# Patient Record
Sex: FEMALE
[De-identification: names, ages, dates, MRNs, and addresses within clinical notes are randomized per-mention and may not be internally consistent; named-entity substitution may affect disease eponyms.]

---

## 2018-10-02 ENCOUNTER — CHARTING TRANS (OUTPATIENT)
Dept: OTHER | Age: 78
End: 2018-10-02

## 2021-06-29 ENCOUNTER — APPOINTMENT (OUTPATIENT)
Dept: GENERAL RADIOLOGY | Age: 81
End: 2021-06-29
Attending: NURSE PRACTITIONER
Payer: MEDICARE

## 2021-06-29 ENCOUNTER — HOSPITAL ENCOUNTER (OUTPATIENT)
Age: 81
Discharge: HOME | End: 2021-06-29
Payer: MEDICARE

## 2021-06-29 VITALS
TEMPERATURE: 98 F | RESPIRATION RATE: 20 BRPM | OXYGEN SATURATION: 100 % | HEART RATE: 73 BPM | SYSTOLIC BLOOD PRESSURE: 140 MMHG | DIASTOLIC BLOOD PRESSURE: 84 MMHG

## 2021-06-29 DIAGNOSIS — S93.402A MILD SPRAIN OF LEFT ANKLE, INITIAL ENCOUNTER: ICD-10-CM

## 2021-06-29 DIAGNOSIS — S92.252A CLOSED AVULSION FRACTURE OF NAVICULAR BONE OF LEFT FOOT, INITIAL ENCOUNTER: ICD-10-CM

## 2021-06-29 DIAGNOSIS — S92.352A CLOSED DISPLACED FRACTURE OF FIFTH METATARSAL BONE OF LEFT FOOT, INITIAL ENCOUNTER: ICD-10-CM
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
## 2021-06-29 PROCEDURE — 99204 OFFICE O/P NEW MOD 45 MIN: CPT | Performed by: NURSE PRACTITIONER

## 2021-06-29 PROCEDURE — E0143 WALKER FOLDING WHEELED W/O S: HCPCS | Performed by: NURSE PRACTITIONER

## 2021-06-29 PROCEDURE — 73630 X-RAY EXAM OF FOOT: CPT | Performed by: NURSE PRACTITIONER

## 2021-06-29 PROCEDURE — 73610 X-RAY EXAM OF ANKLE: CPT | Performed by: NURSE PRACTITIONER

## 2021-06-29 PROCEDURE — 29515 APPLICATION SHORT LEG SPLINT: CPT | Performed by: NURSE PRACTITIONER

## 2021-06-30 NOTE — ED INITIAL ASSESSMENT (HPI)
Pt came in due to fall. Pt stated \"I slipped on two stairs on my porch\". Pt stated fall occurred about a week and a half ago. Pt denies any head injury or LOC. Pt denies taking any blood thinners. Pt has easy non labored respirations. Pt is ambulatory.

## 2021-06-30 NOTE — ED PROVIDER NOTES
Patient Seen in: [facility]      History   Patient presents with:  Leg or Foot Injury    Stated Complaint: LT ANKLE INJURY    HPI/Subjective:   HPI    This is an 80-year-old female presenting for ankle pain and swelling.   Patient states, abo Normal range of motion.         Feet:       Comments: Thoracic lumbar spine no midline TTP or step-offs   Feet:      Comments: Soft tissue swelling and tenderness to palpation left lateral malleolus    Bruising and tenderness to palpation to 2 through 5 met and PE as documented above. X-rays ordered of the foot and ankle anticipate follow-up with orthopedic specialist.    X-rays as resulted above. Discussed x-rays with patient and family at bedside for fractures in the foot.   Discussed application of short

## 2021-07-08 ENCOUNTER — HOSPITAL ENCOUNTER (OUTPATIENT)
Age: 81
Discharge: HOME | End: 2021-07-08
Payer: MEDICARE

## 2021-07-08 VITALS
TEMPERATURE: 98 F | SYSTOLIC BLOOD PRESSURE: 149 MMHG | OXYGEN SATURATION: 98 % | RESPIRATION RATE: 18 BRPM | HEART RATE: 62 BPM | DIASTOLIC BLOOD PRESSURE: 68 MMHG

## 2021-07-08 DIAGNOSIS — S81.802A WOUND OF LEFT LOWER EXTREMITY, INITIAL ENCOUNTER: Primary | ICD-10-CM

## 2021-07-08 DIAGNOSIS — L03.116 CELLULITIS OF LEFT LOWER EXTREMITY: ICD-10-CM

## 2021-07-08 PROCEDURE — A6449 LT COMPRES BAND >=3" <5"/YD: HCPCS | Performed by: NURSE PRACTITIONER

## 2021-07-08 PROCEDURE — 99214 OFFICE O/P EST MOD 30 MIN: CPT | Performed by: NURSE PRACTITIONER

## 2021-07-08 RX ORDER — CEFADROXIL 500 MG/1
500 CAPSULE ORAL 2 TIMES DAILY
Qty: 20 CAPSULE | Refills: 0 | Status: SHIPPED | OUTPATIENT
Start: 2021-07-08 | End: 2021-07-18

## 2021-07-08 RX ORDER — CEFADROXIL 500 MG/1
500 CAPSULE ORAL 2 TIMES DAILY
Qty: 20 CAPSULE | Refills: 0 | Status: SHIPPED | OUTPATIENT
Start: 2021-07-08 | End: 2021-07-08

## 2021-07-08 NOTE — ED INITIAL ASSESSMENT (HPI)
Pt came in due to left foot issue. Pt has a boot placed on her left foot due to fractures, pt stated the boot is uncomfortable and is started to cause a pressure ulcer on her left shin.  Pt stated \"I do not want to wear this boot anymore because it is not

## 2022-06-16 ENCOUNTER — HOSPITAL ENCOUNTER (OUTPATIENT)
Age: 82
Discharge: HOME | End: 2022-06-16
Payer: MEDICARE

## 2022-06-16 VITALS
SYSTOLIC BLOOD PRESSURE: 120 MMHG | RESPIRATION RATE: 18 BRPM | OXYGEN SATURATION: 99 % | TEMPERATURE: 98 F | HEART RATE: 80 BPM | DIASTOLIC BLOOD PRESSURE: 68 MMHG

## 2022-06-16 DIAGNOSIS — Z11.52 ENCOUNTER FOR SCREENING FOR COVID-19: ICD-10-CM

## 2022-06-16 DIAGNOSIS — U07.1 COVID-19 VIRUS DETECTED: Primary | ICD-10-CM

## 2022-06-16 LAB — SARS-COV-2 RNA RESP QL NAA+PROBE: DETECTED

## 2022-06-16 PROCEDURE — U0002 COVID-19 LAB TEST NON-CDC: HCPCS | Performed by: NURSE PRACTITIONER

## 2022-06-16 PROCEDURE — M0222 INTRAVENOUS INJECTION, BEBTELOVIMAB, INCLUDES INJECTION AND POST ADMINISTRATIVE MONITORING: HCPCS | Performed by: NURSE PRACTITIONER

## 2022-06-16 PROCEDURE — 99214 OFFICE O/P EST MOD 30 MIN: CPT | Performed by: NURSE PRACTITIONER

## 2022-06-16 RX ORDER — HYDROCODONE BITARTRATE AND ACETAMINOPHEN 5; 325 MG/1; MG/1
1 TABLET ORAL EVERY 4 HOURS PRN
COMMUNITY
Start: 2022-04-29

## 2022-06-16 RX ORDER — PRAVASTATIN SODIUM 20 MG
TABLET ORAL
COMMUNITY
Start: 2021-04-15

## 2022-06-16 RX ORDER — OMEPRAZOLE 20 MG/1
20 CAPSULE, DELAYED RELEASE ORAL DAILY
COMMUNITY
Start: 2022-04-05

## 2022-06-16 RX ORDER — ESTRADIOL 0.1 MG/G
1 CREAM VAGINAL AS DIRECTED
COMMUNITY
Start: 2022-02-22

## 2022-06-16 RX ORDER — BEBTELOVIMAB 87.5 MG/ML
175 INJECTION, SOLUTION INTRAVENOUS ONCE
Status: COMPLETED | OUTPATIENT
Start: 2022-06-16 | End: 2022-06-16

## 2022-06-16 RX ORDER — BICTEGRAVIR SODIUM, EMTRICITABINE, AND TENOFOVIR ALAFENAMIDE FUMARATE 50; 200; 25 MG/1; MG/1; MG/1
1 TABLET ORAL DAILY
COMMUNITY
Start: 2022-04-05

## 2022-06-16 RX ORDER — BUPROPION HYDROCHLORIDE 300 MG/1
TABLET ORAL
COMMUNITY
Start: 2022-04-13

## 2022-06-16 RX ORDER — LEVOTHYROXINE SODIUM 0.15 MG/1
TABLET ORAL
COMMUNITY
Start: 2022-02-11

## 2022-06-16 RX ORDER — LOSARTAN POTASSIUM 50 MG/1
TABLET ORAL
COMMUNITY
Start: 2022-02-11

## 2022-06-16 NOTE — ED INITIAL ASSESSMENT (HPI)
Pt states found out grandchild was pos for covid on Monday tested pos. PT states felt bad Tuesday and slept all day and yesterday felt great. Pt states was going to a luncheon today but tested to make sure she was okay but tested pos on an at home.

## 2023-06-01 ENCOUNTER — APPOINTMENT (OUTPATIENT)
Dept: GENERAL RADIOLOGY | Age: 83
End: 2023-06-01
Attending: NURSE PRACTITIONER
Payer: MEDICARE

## 2023-06-01 ENCOUNTER — HOSPITAL ENCOUNTER (INPATIENT)
Facility: HOSPITAL | Age: 83
LOS: 2 days | Discharge: HOME | End: 2023-06-03
Attending: EMERGENCY MEDICINE | Admitting: HOSPITALIST
Payer: MEDICARE

## 2023-06-01 ENCOUNTER — APPOINTMENT (OUTPATIENT)
Dept: CT IMAGING | Facility: HOSPITAL | Age: 83
End: 2023-06-01
Attending: EMERGENCY MEDICINE
Payer: MEDICARE

## 2023-06-01 ENCOUNTER — HOSPITAL ENCOUNTER (OUTPATIENT)
Age: 83
Discharge: ANOTHER HOSPITAL | End: 2023-06-01
Payer: MEDICARE

## 2023-06-01 VITALS
DIASTOLIC BLOOD PRESSURE: 57 MMHG | TEMPERATURE: 97 F | OXYGEN SATURATION: 98 % | RESPIRATION RATE: 20 BRPM | SYSTOLIC BLOOD PRESSURE: 117 MMHG | HEART RATE: 76 BPM

## 2023-06-01 DIAGNOSIS — S32.010A COMPRESSION FRACTURE OF L1 VERTEBRA, INITIAL ENCOUNTER (HCC): Primary | ICD-10-CM

## 2023-06-01 DIAGNOSIS — M54.9 BACK PAIN WITHOUT RADIATION: ICD-10-CM

## 2023-06-01 DIAGNOSIS — S32.000A COMPRESSION FRACTURE OF LUMBAR VERTEBRA, UNSPECIFIED LUMBAR VERTEBRAL LEVEL, INITIAL ENCOUNTER (HCC): Primary | ICD-10-CM

## 2023-06-01 LAB
ANION GAP SERPL CALC-SCNC: 6 MMOL/L (ref 0–18)
APTT PPP: 26.6 SECONDS (ref 23.3–35.6)
BASOPHILS # BLD AUTO: 0.01 X10(3) UL (ref 0–0.2)
BASOPHILS NFR BLD AUTO: 0.2 %
BUN BLD-MCNC: 24 MG/DL (ref 7–18)
BUN/CREAT SERPL: 24.5 (ref 10–20)
CALCIUM BLD-MCNC: 9.5 MG/DL (ref 8.5–10.1)
CHLORIDE SERPL-SCNC: 110 MMOL/L (ref 98–112)
CO2 SERPL-SCNC: 26 MMOL/L (ref 21–32)
CREAT BLD-MCNC: 0.98 MG/DL
DEPRECATED RDW RBC AUTO: 49.9 FL (ref 35.1–46.3)
EOSINOPHIL # BLD AUTO: 0.08 X10(3) UL (ref 0–0.7)
EOSINOPHIL NFR BLD AUTO: 1.4 %
ERYTHROCYTE [DISTWIDTH] IN BLOOD BY AUTOMATED COUNT: 14 % (ref 11–15)
GFR SERPLBLD BASED ON 1.73 SQ M-ARVRAT: 58 ML/MIN/1.73M2 (ref 60–?)
GLUCOSE BLD-MCNC: 113 MG/DL (ref 70–99)
HCT VFR BLD AUTO: 34 %
HGB BLD-MCNC: 11.2 G/DL
IMM GRANULOCYTES # BLD AUTO: 0 X10(3) UL (ref 0–1)
IMM GRANULOCYTES NFR BLD: 0 %
INR BLD: 1.02 (ref 0.85–1.16)
LYMPHOCYTES # BLD AUTO: 2.21 X10(3) UL (ref 1–4)
LYMPHOCYTES NFR BLD AUTO: 37.5 %
MCH RBC QN AUTO: 32.2 PG (ref 26–34)
MCHC RBC AUTO-ENTMCNC: 32.9 G/DL (ref 31–37)
MCV RBC AUTO: 97.7 FL
MONOCYTES # BLD AUTO: 0.59 X10(3) UL (ref 0.1–1)
MONOCYTES NFR BLD AUTO: 10 %
NEUTROPHILS # BLD AUTO: 3.01 X10 (3) UL (ref 1.5–7.7)
NEUTROPHILS # BLD AUTO: 3.01 X10(3) UL (ref 1.5–7.7)
NEUTROPHILS NFR BLD AUTO: 50.9 %
OSMOLALITY SERPL CALC.SUM OF ELEC: 299 MOSM/KG (ref 275–295)
PLATELET # BLD AUTO: 289 10(3)UL (ref 150–450)
POTASSIUM SERPL-SCNC: 4.2 MMOL/L (ref 3.5–5.1)
PROTHROMBIN TIME: 13.3 SECONDS (ref 11.6–14.8)
RBC # BLD AUTO: 3.48 X10(6)UL
SODIUM SERPL-SCNC: 142 MMOL/L (ref 136–145)
WBC # BLD AUTO: 5.9 X10(3) UL (ref 4–11)

## 2023-06-01 PROCEDURE — 99223 1ST HOSP IP/OBS HIGH 75: CPT | Performed by: HOSPITALIST

## 2023-06-01 PROCEDURE — 99214 OFFICE O/P EST MOD 30 MIN: CPT | Performed by: NURSE PRACTITIONER

## 2023-06-01 PROCEDURE — 72100 X-RAY EXAM L-S SPINE 2/3 VWS: CPT | Performed by: NURSE PRACTITIONER

## 2023-06-01 PROCEDURE — 72131 CT LUMBAR SPINE W/O DYE: CPT | Performed by: EMERGENCY MEDICINE

## 2023-06-01 RX ORDER — BISACODYL 10 MG
10 SUPPOSITORY, RECTAL RECTAL
Status: DISCONTINUED | OUTPATIENT
Start: 2023-06-01 | End: 2023-06-03

## 2023-06-01 RX ORDER — HYDROCODONE BITARTRATE AND ACETAMINOPHEN 5; 325 MG/1; MG/1
1 TABLET ORAL EVERY 4 HOURS PRN
Status: DISCONTINUED | OUTPATIENT
Start: 2023-06-01 | End: 2023-06-03

## 2023-06-01 RX ORDER — NITROFURANTOIN 25; 75 MG/1; MG/1
CAPSULE ORAL
COMMUNITY
Start: 2023-05-02 | End: 2023-06-03

## 2023-06-01 RX ORDER — MORPHINE SULFATE 2 MG/ML
2 INJECTION, SOLUTION INTRAMUSCULAR; INTRAVENOUS EVERY 2 HOUR PRN
Status: DISCONTINUED | OUTPATIENT
Start: 2023-06-01 | End: 2023-06-03

## 2023-06-01 RX ORDER — MORPHINE SULFATE 2 MG/ML
1 INJECTION, SOLUTION INTRAMUSCULAR; INTRAVENOUS EVERY 2 HOUR PRN
Status: DISCONTINUED | OUTPATIENT
Start: 2023-06-01 | End: 2023-06-03

## 2023-06-01 RX ORDER — BUPROPION HYDROCHLORIDE 300 MG/1
300 TABLET ORAL DAILY
Status: DISCONTINUED | OUTPATIENT
Start: 2023-06-02 | End: 2023-06-03

## 2023-06-01 RX ORDER — ACETAMINOPHEN 325 MG/1
650 TABLET ORAL EVERY 4 HOURS PRN
Status: DISCONTINUED | OUTPATIENT
Start: 2023-06-01 | End: 2023-06-03

## 2023-06-01 RX ORDER — HYDROCODONE BITARTRATE AND ACETAMINOPHEN 5; 325 MG/1; MG/1
1 TABLET ORAL ONCE
Status: COMPLETED | OUTPATIENT
Start: 2023-06-01 | End: 2023-06-01

## 2023-06-01 RX ORDER — GENTAMICIN SULFATE 1 MG/G
OINTMENT TOPICAL
COMMUNITY
Start: 2023-01-06

## 2023-06-01 RX ORDER — MORPHINE SULFATE 4 MG/ML
4 INJECTION, SOLUTION INTRAMUSCULAR; INTRAVENOUS EVERY 2 HOUR PRN
Status: DISCONTINUED | OUTPATIENT
Start: 2023-06-01 | End: 2023-06-03

## 2023-06-01 RX ORDER — LEVOTHYROXINE SODIUM 0.15 MG/1
150 TABLET ORAL
Status: DISCONTINUED | OUTPATIENT
Start: 2023-06-02 | End: 2023-06-02

## 2023-06-01 RX ORDER — PRAVASTATIN SODIUM 20 MG
20 TABLET ORAL NIGHTLY
Status: DISCONTINUED | OUTPATIENT
Start: 2023-06-01 | End: 2023-06-03

## 2023-06-01 RX ORDER — HYDROCODONE BITARTRATE AND ACETAMINOPHEN 5; 325 MG/1; MG/1
2 TABLET ORAL EVERY 4 HOURS PRN
Status: DISCONTINUED | OUTPATIENT
Start: 2023-06-01 | End: 2023-06-03

## 2023-06-01 RX ORDER — METOCLOPRAMIDE HYDROCHLORIDE 5 MG/ML
10 INJECTION INTRAMUSCULAR; INTRAVENOUS EVERY 8 HOURS PRN
Status: DISCONTINUED | OUTPATIENT
Start: 2023-06-01 | End: 2023-06-03

## 2023-06-01 RX ORDER — LOSARTAN POTASSIUM 100 MG/1
TABLET ORAL
COMMUNITY
Start: 2023-04-27

## 2023-06-01 RX ORDER — SENNOSIDES 8.6 MG
17.2 TABLET ORAL NIGHTLY PRN
Status: DISCONTINUED | OUTPATIENT
Start: 2023-06-01 | End: 2023-06-03

## 2023-06-01 RX ORDER — ENEMA 19; 7 G/133ML; G/133ML
1 ENEMA RECTAL ONCE AS NEEDED
Status: DISCONTINUED | OUTPATIENT
Start: 2023-06-01 | End: 2023-06-03

## 2023-06-01 RX ORDER — ONDANSETRON 2 MG/ML
4 INJECTION INTRAMUSCULAR; INTRAVENOUS EVERY 6 HOURS PRN
Status: DISCONTINUED | OUTPATIENT
Start: 2023-06-01 | End: 2023-06-03

## 2023-06-01 RX ORDER — CLINDAMYCIN HYDROCHLORIDE 300 MG/1
CAPSULE ORAL
COMMUNITY
Start: 2023-01-06 | End: 2023-06-03

## 2023-06-01 RX ORDER — POLYETHYLENE GLYCOL 3350 17 G/17G
17 POWDER, FOR SOLUTION ORAL DAILY PRN
Status: DISCONTINUED | OUTPATIENT
Start: 2023-06-01 | End: 2023-06-03

## 2023-06-01 RX ORDER — PANTOPRAZOLE SODIUM 20 MG/1
20 TABLET, DELAYED RELEASE ORAL
Status: DISCONTINUED | OUTPATIENT
Start: 2023-06-02 | End: 2023-06-03

## 2023-06-01 NOTE — CM/SW NOTE
06/01/23 1700   CM/SW Referral Data   Referral Source    Reason for Referral Discharge planning   Informant Patient   Medical Hx   Does patient have an established PCP? Yes  Ida Shape)   Patient Info   Patient's Current Mental Status at Time of Assessment Alert;Oriented   Patient's Home Environment Condo/Apt with elevator   Patient lives with Alone   Patient Status Prior to Admission   Independent with ADLs and Mobility Yes   Discharge Needs   Anticipated D/C needs No anticipated discharge needs     Pt discussed during nursing rounds. Dx L2 compression fx, ambulatory in room. From home alone, independent w/o device prior to admission. Pt's daughter will stay with patient for extra assistance at dc. Pt declining HH services if recommended at dc. No home care needs anticipated on dc. Plan: Home w/daughter pending evals and medical clearance. / to remain available for support and/or discharge planning.      RUBIA Ch    [phone]

## 2023-06-01 NOTE — ED QUICK NOTES
Orders for admission, patient is aware of plan and ready to go upstairs. Any questions, please call ED RN aissatou at extension [phone].      Patient Covid vaccination status: Fully vaccinated     COVID Test Ordered in ED: None    COVID Suspicion at Admission: N/A    Running Infusions:  None    Mental Status/LOC at time of transport: AXOX4    Other pertinent information:   CIWA score: N/A   NIH score:  N/A

## 2023-06-01 NOTE — ED INITIAL ASSESSMENT (HPI)
Pt states on Monday had a 2 drinks and became dizzy, pt states took a back step off a curb and fell back. Pt denies LOC. Pt states felt okay and get back up. Pt states had some slight back pain, but then a few hours after the fall the pain in back became worse. Pt states was feeling better but then this morning pain returned once again. Pt states walking helps with pain.

## 2023-06-01 NOTE — PLAN OF CARE
Patient is a direct admit from ED. Patient fell few days ago on back pain getting worse. Patient is A&IOX4, RA, SCDs bilat, patient is denying needing pain medication at this time. Patient is up standby assist in room, does not need walker. Patient tolerating diet well. Plan is for MRI(MRI screen done) and then consult with ortho after MRI results for actual plan. Patient has small cut on right shin and has put a small Band-Aid on it from home. No bruising noted on back/bottom, patient sore on lower back gave hot pack, patient states this is the best for her. Patient is HIV positive(patient would like to remain confidential with this information). Patient is saline locked. Bed in lowest position and call light within reach.    Problem: Patient Centered Care  Goal: Patient preferences are identified and integrated in the patient's plan of care  Description: Interventions:  - What would you like us to know as we care for you?   - Provide timely, complete, and accurate information to patient/family  - Incorporate patient and family knowledge, values, beliefs, and cultural backgrounds into the planning and delivery of care  - Encourage patient/family to participate in care and decision-making at the level they choose  - Honor patient and family perspectives and choices  Outcome: Progressing

## 2023-06-01 NOTE — ED INITIAL ASSESSMENT (HPI)
Patient here with daughter in law for lower back pain s/p fall on Monday. Went to IC had an xray done which showed a compression fx. Pt was told she needed to have a CT scan done. HX of HIV, states her numbers are under control.

## 2023-06-02 ENCOUNTER — APPOINTMENT (OUTPATIENT)
Dept: MRI IMAGING | Facility: HOSPITAL | Age: 83
End: 2023-06-02
Attending: HOSPITALIST
Payer: MEDICARE

## 2023-06-02 LAB
ANION GAP SERPL CALC-SCNC: 2 MMOL/L (ref 0–18)
BASOPHILS # BLD AUTO: 0.02 X10(3) UL (ref 0–0.2)
BASOPHILS NFR BLD AUTO: 0.4 %
BUN BLD-MCNC: 24 MG/DL (ref 7–18)
BUN/CREAT SERPL: 26.4 (ref 10–20)
CALCIUM BLD-MCNC: 8.9 MG/DL (ref 8.5–10.1)
CHLORIDE SERPL-SCNC: 108 MMOL/L (ref 98–112)
CO2 SERPL-SCNC: 28 MMOL/L (ref 21–32)
CREAT BLD-MCNC: 0.91 MG/DL
DEPRECATED RDW RBC AUTO: 48.2 FL (ref 35.1–46.3)
EOSINOPHIL # BLD AUTO: 0.12 X10(3) UL (ref 0–0.7)
EOSINOPHIL NFR BLD AUTO: 2.2 %
ERYTHROCYTE [DISTWIDTH] IN BLOOD BY AUTOMATED COUNT: 13.6 % (ref 11–15)
GFR SERPLBLD BASED ON 1.73 SQ M-ARVRAT: 63 ML/MIN/1.73M2 (ref 60–?)
GLUCOSE BLD-MCNC: 104 MG/DL (ref 70–99)
HCT VFR BLD AUTO: 32.8 %
HGB BLD-MCNC: 10.8 G/DL
IMM GRANULOCYTES # BLD AUTO: 0.01 X10(3) UL (ref 0–1)
IMM GRANULOCYTES NFR BLD: 0.2 %
LYMPHOCYTES # BLD AUTO: 2.58 X10(3) UL (ref 1–4)
LYMPHOCYTES NFR BLD AUTO: 47.9 %
MAGNESIUM SERPL-MCNC: 2.2 MG/DL (ref 1.6–2.6)
MCH RBC QN AUTO: 31.5 PG (ref 26–34)
MCHC RBC AUTO-ENTMCNC: 32.9 G/DL (ref 31–37)
MCV RBC AUTO: 95.6 FL
MONOCYTES # BLD AUTO: 0.63 X10(3) UL (ref 0.1–1)
MONOCYTES NFR BLD AUTO: 11.7 %
NEUTROPHILS # BLD AUTO: 2.03 X10 (3) UL (ref 1.5–7.7)
NEUTROPHILS # BLD AUTO: 2.03 X10(3) UL (ref 1.5–7.7)
NEUTROPHILS NFR BLD AUTO: 37.6 %
OSMOLALITY SERPL CALC.SUM OF ELEC: 290 MOSM/KG (ref 275–295)
PLATELET # BLD AUTO: 269 10(3)UL (ref 150–450)
POTASSIUM SERPL-SCNC: 4.3 MMOL/L (ref 3.5–5.1)
RBC # BLD AUTO: 3.43 X10(6)UL
SODIUM SERPL-SCNC: 138 MMOL/L (ref 136–145)
WBC # BLD AUTO: 5.4 X10(3) UL (ref 4–11)

## 2023-06-02 PROCEDURE — 99232 SBSQ HOSP IP/OBS MODERATE 35: CPT | Performed by: INTERNAL MEDICINE

## 2023-06-02 PROCEDURE — 72148 MRI LUMBAR SPINE W/O DYE: CPT | Performed by: HOSPITALIST

## 2023-06-02 RX ORDER — LEVOTHYROXINE SODIUM 0.15 MG/1
150 TABLET ORAL NIGHTLY
Status: DISCONTINUED | OUTPATIENT
Start: 2023-06-02 | End: 2023-06-03

## 2023-06-02 NOTE — PHYSICAL THERAPY NOTE
Chart reviewed, patient having MRI today at 2:00 pm, after the results come through a medical plan of care will be established. Discussed with RN who states patient has been ambulating independently in the room and therapy may be needed after intervention. Will plan to evaluate patient tomorrow pending medical plan of care. RN aware and agreeable. Depending on what is done medically for intervention patient may need new physical therapy orders.

## 2023-06-02 NOTE — OCCUPATIONAL THERAPY NOTE
Orders received, chart reviewed for OT evaluation. Pt awaiting results of MRI and further IR consult. Will follow-up tomorrow. RN Arabella aware.

## 2023-06-02 NOTE — PLAN OF CARE
Patient is A&IOX4, RA, SCDs bilat, patient is denying needing pain medication at this time. Patient is up standby assist in room, does not need walker. Patient tolerating diet well. Plan is for MRI(MRI screen done) and then consult with ortho after MRI results for actual plan. Mri is planned for 1400 confirmed about implant in ear and everything is good to go from that standpoint. Patient is HIV positive(patient would like to remain confidential with this information). Patient is saline locked. Bed in lowest position and call light within reach.    Problem: Patient Centered Care  Goal: Patient preferences are identified and integrated in the patient's plan of care  Description: Interventions:  - What would you like us to know as we care for you?   - Provide timely, complete, and accurate information to patient/family  - Incorporate patient and family knowledge, values, beliefs, and cultural backgrounds into the planning and delivery of care  - Encourage patient/family to participate in care and decision-making at the level they choose  - Honor patient and family perspectives and choices  Outcome: Progressing

## 2023-06-02 NOTE — PLAN OF CARE
Patient is alert and oriented. Pain is present when moving or repositioning. Gave norco 5 overnight. Waiting on MRI scheduling for Friday a.m. to determine if surgery is needed. Can ambulate without assistance. **patient is HIV+ which she requests be kept confidential from family**. Call light and belongings within reach.     Problem: Patient Centered Care  Goal: Patient preferences are identified and integrated in the patient's plan of care  Description: Interventions:  - What would you like us to know as we care for you?   - Provide timely, complete, and accurate information to patient/family  - Incorporate patient and family knowledge, values, beliefs, and cultural backgrounds into the planning and delivery of care  - Encourage patient/family to participate in care and decision-making at the level they choose  - Honor patient and family perspectives and choices  Outcome: Progressing     Problem: Patient/Family Goals  Goal: Patient/Family Long Term Goal  Description: Patient's Long Term Goal:     Interventions:  -   - See additional Care Plan goals for specific interventions  Outcome: Progressing  Goal: Patient/Family Short Term Goal  Description: Patient's Short Term Goal:     Interventions:   -   - See additional Care Plan goals for specific interventions  Outcome: Progressing

## 2023-06-03 VITALS
DIASTOLIC BLOOD PRESSURE: 63 MMHG | HEART RATE: 71 BPM | HEIGHT: 65 IN | WEIGHT: 134.63 LBS | BODY MASS INDEX: 22.43 KG/M2 | TEMPERATURE: 98 F | SYSTOLIC BLOOD PRESSURE: 120 MMHG | RESPIRATION RATE: 18 BRPM | OXYGEN SATURATION: 94 %

## 2023-06-03 LAB
ANION GAP SERPL CALC-SCNC: 6 MMOL/L (ref 0–18)
BASOPHILS # BLD AUTO: 0.02 X10(3) UL (ref 0–0.2)
BASOPHILS NFR BLD AUTO: 0.4 %
BUN BLD-MCNC: 21 MG/DL (ref 7–18)
BUN/CREAT SERPL: 21.2 (ref 10–20)
CALCIUM BLD-MCNC: 9.4 MG/DL (ref 8.5–10.1)
CHLORIDE SERPL-SCNC: 107 MMOL/L (ref 98–112)
CO2 SERPL-SCNC: 28 MMOL/L (ref 21–32)
CREAT BLD-MCNC: 0.99 MG/DL
DEPRECATED RDW RBC AUTO: 48.3 FL (ref 35.1–46.3)
EOSINOPHIL # BLD AUTO: 0.16 X10(3) UL (ref 0–0.7)
EOSINOPHIL NFR BLD AUTO: 3.1 %
ERYTHROCYTE [DISTWIDTH] IN BLOOD BY AUTOMATED COUNT: 13.5 % (ref 11–15)
GFR SERPLBLD BASED ON 1.73 SQ M-ARVRAT: 57 ML/MIN/1.73M2 (ref 60–?)
GLUCOSE BLD-MCNC: 99 MG/DL (ref 70–99)
HCT VFR BLD AUTO: 34.2 %
HGB BLD-MCNC: 11.1 G/DL
IMM GRANULOCYTES # BLD AUTO: 0.01 X10(3) UL (ref 0–1)
IMM GRANULOCYTES NFR BLD: 0.2 %
LYMPHOCYTES # BLD AUTO: 2.4 X10(3) UL (ref 1–4)
LYMPHOCYTES NFR BLD AUTO: 45.9 %
MCH RBC QN AUTO: 31.4 PG (ref 26–34)
MCHC RBC AUTO-ENTMCNC: 32.5 G/DL (ref 31–37)
MCV RBC AUTO: 96.6 FL
MONOCYTES # BLD AUTO: 0.58 X10(3) UL (ref 0.1–1)
MONOCYTES NFR BLD AUTO: 11.1 %
NEUTROPHILS # BLD AUTO: 2.06 X10 (3) UL (ref 1.5–7.7)
NEUTROPHILS # BLD AUTO: 2.06 X10(3) UL (ref 1.5–7.7)
NEUTROPHILS NFR BLD AUTO: 39.3 %
OSMOLALITY SERPL CALC.SUM OF ELEC: 295 MOSM/KG (ref 275–295)
PLATELET # BLD AUTO: 301 10(3)UL (ref 150–450)
POTASSIUM SERPL-SCNC: 4.3 MMOL/L (ref 3.5–5.1)
RBC # BLD AUTO: 3.54 X10(6)UL
SODIUM SERPL-SCNC: 141 MMOL/L (ref 136–145)
WBC # BLD AUTO: 5.2 X10(3) UL (ref 4–11)

## 2023-06-03 PROCEDURE — 99239 HOSP IP/OBS DSCHRG MGMT >30: CPT | Performed by: HOSPITALIST

## 2023-06-03 NOTE — OCCUPATIONAL THERAPY NOTE
Orders received, chart review complete, pt up with nursing staff ad andrew. RN cancelled therapy order, please re-order if change in status occurs. POC not established for a kyphoplasty.      Autumn Bain OTR/L  [address]  [facility]

## 2023-06-03 NOTE — PHYSICAL THERAPY NOTE
06/03/23 0942   VISIT TYPE   PT Inpatient Visit Type (Documentation Required) Attempted Evaluation  (PT orders received, chart reviewed. Per discussion with RN, pt has been ambulating independently with minimal pain at this time, at baseline functional level. Pending MRI results and possible kyphoplasty.  Will DC PT order at this time.)

## 2023-06-03 NOTE — PLAN OF CARE
Patient is A&IOX4, RA, SCDs bilat, patient is denying needing pain medication at this time. Patient is up standby assist in room, does not need walker. Patient tolerating diet well. Patient is HIV positive(patient would like to remain confidential with this information). Patient is saline locked. Bed in lowest position and call light within reach. Patient has clearances to discharge home today and follow up with IR on Monday. Paperwork went over with patient and given to patient.    Problem: Patient Centered Care  Goal: Patient preferences are identified and integrated in the patient's plan of care  Description: Interventions:  - What would you like us to know as we care for you?   - Provide timely, complete, and accurate information to patient/family  - Incorporate patient and family knowledge, values, beliefs, and cultural backgrounds into the planning and delivery of care  - Encourage patient/family to participate in care and decision-making at the level they choose  - Honor patient and family perspectives and choices  Outcome: Adequate for Discharge

## 2023-06-03 NOTE — PLAN OF CARE
Patient is alert and oriented. Denies need for pain medication overnite. Heat pack to relieve stiffness. Rt cochlear implant. Is able to ambulate with standby assistance. Voiding well, but requested Miralax for constipation. Waiting on results of MRI to determine if surgery is needed. Call light and belongings within reach.       Problem: Patient Centered Care  Goal: Patient preferences are identified and integrated in the patient's plan of care  Description: Interventions:  - What would you like us to know as we care for you?   - Provide timely, complete, and accurate information to patient/family  - Incorporate patient and family knowledge, values, beliefs, and cultural backgrounds into the planning and delivery of care  - Encourage patient/family to participate in care and decision-making at the level they choose  - Honor patient and family perspectives and choices  6/3/2023 0453 by Luh Garza RN  Outcome: Progressing  6/3/2023 0452 by Luh Garza RN  Outcome: Progressing     Problem: Patient/Family Goals  Goal: Patient/Family Long Term Goal  Description: Patient's Long Term Goal:     Interventions:  -   - See additional Care Plan goals for specific interventions  6/3/2023 0453 by Luh Garza RN  Outcome: Progressing  6/3/2023 0452 by Luh Garza RN  Outcome: Progressing  Goal: Patient/Family Short Term Goal  Description: Patient's Short Term Goal:     Interventions:   -   - See additional Care Plan goals for specific interventions  6/3/2023 0453 by Luh Garza RN  Outcome: Progressing  6/3/2023 0452 by Luh Garza RN  Outcome: Progressing     Problem: PAIN - ADULT  Goal: Verbalizes/displays adequate comfort level or patient's stated pain goal  Description: INTERVENTIONS:  - Encourage pt to monitor pain and request assistance  - Assess pain using appropriate pain scale  - Administer analgesics based on type and severity of pain and evaluate response  - Implement non-pharmacological measures as appropriate and evaluate response  - Consider cultural and social influences on pain and pain management  - Manage/alleviate anxiety  - Utilize distraction and/or relaxation techniques  - Monitor for opioid side effects  - Notify MD/LIP if interventions unsuccessful or patient reports new pain  - Anticipate increased pain with activity and pre-medicate as appropriate  Outcome: Progressing     Problem: Altered Communication/Language Barrier  Goal: Patient/Family is able to understand and participate in their care  Description: Interventions:  - Assess communication ability and preferred communication style  - Implement communication aides and strategies  - Use visual cues when possible  - Listen attentively, be patient, do not interrupt  - Minimize distractions  - Allow time for understanding and response  - Establish method for patient to ask for assistance (call light)  - Provide an  as needed  - Communicate barriers and strategies to overcome with those who interact with patient  Outcome: Progressing

## 2023-06-09 ENCOUNTER — HOSPITAL ENCOUNTER (OUTPATIENT)
Dept: INTERVENTIONAL RADIOLOGY/VASCULAR | Facility: HOSPITAL | Age: 83
Discharge: HOME | End: 2023-06-09
Attending: CLINICAL NURSE SPECIALIST | Admitting: RADIOLOGY
Payer: MEDICARE

## 2023-06-09 VITALS
SYSTOLIC BLOOD PRESSURE: 126 MMHG | OXYGEN SATURATION: 98 % | DIASTOLIC BLOOD PRESSURE: 73 MMHG | WEIGHT: 133 LBS | TEMPERATURE: 97 F | BODY MASS INDEX: 21.89 KG/M2 | RESPIRATION RATE: 12 BRPM | HEART RATE: 55 BPM | HEIGHT: 65.5 IN

## 2023-06-09 DIAGNOSIS — S32.010A COMPRESSION FRACTURE OF L1 VERTEBRA, INITIAL ENCOUNTER (HCC): ICD-10-CM

## 2023-06-09 PROCEDURE — 22514 PERQ VERTEBRAL AUGMENTATION: CPT | Performed by: RADIOLOGY

## 2023-06-09 PROCEDURE — 0QU03JZ SUPPLEMENT LUMBAR VERTEBRA WITH SYNTHETIC SUBSTITUTE, PERCUTANEOUS APPROACH: ICD-10-PCS | Performed by: RADIOLOGY

## 2023-06-09 PROCEDURE — 36415 COLL VENOUS BLD VENIPUNCTURE: CPT

## 2023-06-09 PROCEDURE — 99152 MOD SED SAME PHYS/QHP 5/>YRS: CPT | Performed by: RADIOLOGY

## 2023-06-09 PROCEDURE — 0QS03ZZ REPOSITION LUMBAR VERTEBRA, PERCUTANEOUS APPROACH: ICD-10-PCS | Performed by: RADIOLOGY

## 2023-06-09 RX ORDER — LIDOCAINE HYDROCHLORIDE 20 MG/ML
INJECTION, SOLUTION INFILTRATION; PERINEURAL
Status: COMPLETED
Start: 2023-06-09 | End: 2023-06-09

## 2023-06-09 RX ORDER — ACETAMINOPHEN 500 MG
TABLET ORAL
Status: COMPLETED
Start: 2023-06-09 | End: 2023-06-09

## 2023-06-09 RX ORDER — MIDAZOLAM HYDROCHLORIDE 1 MG/ML
INJECTION INTRAMUSCULAR; INTRAVENOUS
Status: COMPLETED
Start: 2023-06-09 | End: 2023-06-09

## 2023-06-09 RX ORDER — ACETAMINOPHEN 500 MG
1000 TABLET ORAL ONCE
Status: COMPLETED | OUTPATIENT
Start: 2023-06-09 | End: 2023-06-09

## 2023-06-09 RX ORDER — SODIUM CHLORIDE 9 MG/ML
INJECTION, SOLUTION INTRAVENOUS CONTINUOUS
Status: DISCONTINUED | OUTPATIENT
Start: 2023-06-09 | End: 2023-06-09

## 2023-06-09 RX ORDER — MORPHINE SULFATE 4 MG/ML
INJECTION, SOLUTION INTRAMUSCULAR; INTRAVENOUS
Status: COMPLETED
Start: 2023-06-09 | End: 2023-06-09

## 2023-06-09 RX ORDER — MORPHINE SULFATE 4 MG/ML
4 INJECTION, SOLUTION INTRAMUSCULAR; INTRAVENOUS ONCE
Status: COMPLETED | OUTPATIENT
Start: 2023-06-09 | End: 2023-06-09

## 2023-06-09 RX ORDER — CLINDAMYCIN PHOSPHATE 900 MG/50ML
INJECTION INTRAVENOUS
Status: COMPLETED
Start: 2023-06-09 | End: 2023-06-09

## 2023-06-09 RX ADMIN — SODIUM CHLORIDE: 9 INJECTION, SOLUTION INTRAVENOUS at 11:00:00

## 2023-06-09 RX ADMIN — MORPHINE SULFATE 4 MG: 4 INJECTION, SOLUTION INTRAMUSCULAR; INTRAVENOUS at 13:28:00

## 2023-06-09 RX ADMIN — ACETAMINOPHEN 1000 MG: 500 MG TABLET ORAL at 13:15:00

## 2023-06-09 NOTE — DISCHARGE INSTRUCTIONS
Pr-14  4.2  [phone]     Patient Name:  Ivon Johnson    Procedure:  Kyphoplasty     Site Care: Remove your band-aid or dressing in 24 hours. Gently wash area with soap and water. Activity/Diet  No heavy lifting or strenuous activity for 48 hours. Drink plenty of fluids, unless you have otherwise been told to restrict your fluid intake. Do not drink alcohol for 24 hours. Do not drive,  operate heavy machinery, make important decisions or sign legal documents today. Medications: Take acetaminophen if needed for pain. Do not exceed 4000mg of acetaminophen in a 24-hour period. , Do not take blood thinners, aspirin, or Plavix for 24 hours. , and Make no changes to your existing medications. Contact Interventional Radiology at [phone] if you have severe/unrelieved pain, fever, chills, dizziness/lightheadedness, or drainage/bleeding from your incision site.

## 2023-06-09 NOTE — IVS NOTE
Pain c/o pain/discomfort in back, rates it at 7/10 pain scale. Checked procedural area, dressing dry and intact. Maranda GARCIA called and made aware. Received an order for Tylenol 1000 mg. Pt continues to have pain and discomfort, Dr. Bobbi Narayan made aware and received an order for Morphine 4 mg IVP x 1 dose. Medications given and documented, will continue to monitor. Reinforced bedrest/flat position x 2 hours until 1400        6/9/23 1513 DISCHARGE NOTE     Pt denies any pain or discomfort   Pt is able to sit up and ambulate without difficulty. Pt voided and tolerated fluids and food. Procedural site remains dry and intact with good circulation, motion, and sensation. No signs and symptoms of bleeding/hematoma noted. IV access removed  Instruction provided, patient/family verbalizes understanding. Dr. Bobbi Narayan spoke with patient/family post procedure.      Pt discharge via wheelchair to [facility]     Follow up Appointment:   Pt to call office to see Giorgio Ca or Dr. Bobbi Narayan in 1 to 2 weeks     New Prescription: n/a

## 2023-06-09 NOTE — IVS NOTE
Pain c/o pain/discomfort in back, rates it at 7/10 pain scale. Checked procedural area, dressing dry and intact. Maranda GARCIA called and made aware. Received an order for Tylenol 1000 mg. Pt continues to have pain and discomfort, Dr. Daniel Werner made aware and received an order for Morphine 4 mg IVP x 1 dose. Medications given and documented, will continue to monitor.    Reinforced bedrest/flat position x 2 hours until 1400

## 2023-06-12 NOTE — INTERVAL H&P NOTE
The above referenced H&P was reviewed by Trisha Davis MD on 6/12/2023, the patient was examined and no significant changes have occurred in the patient's condition since the H&P was performed. Risks, benefits, alternative treatments and consequences of no treatment were discussed. We will proceed with procedure as planned.       Trisha Davis MD  6/12/2023  2:25 PM

## 2023-12-11 NOTE — ED PROVIDER NOTES
Detail Level: Detailed Patient Seen in: [facility]      History   Patient presents with: Foot Pain    Stated Complaint: foot issue    HPI/Subjective:   HPI    This is an 59-year-old female presenting with a sore on the leg.   Patient states, she was seen here at t Oropharynx is clear. Eyes:      Conjunctiva/sclera: Conjunctivae normal.   Cardiovascular:      Rate and Rhythm: Normal rate. Pulmonary:      Effort: Pulmonary effort is normal.   Musculoskeletal:         General: Normal range of motion.       Cervical discussed with Dr. Amie Severin.                      Disposition and Plan     Clinical Impression:  Wound of left lower extremity, initial encounter  (primary encounter diagnosis)  Cellulitis of left lower extremity     Disposition:  Discharge  7/8/2021  4:25 p

## 2024-01-02 ENCOUNTER — APPOINTMENT (OUTPATIENT)
Dept: GENERAL RADIOLOGY | Age: 84
End: 2024-01-02
Attending: EMERGENCY MEDICINE
Payer: MEDICARE

## 2024-01-02 ENCOUNTER — HOSPITAL ENCOUNTER (OUTPATIENT)
Age: 84
Discharge: HOME | End: 2024-01-02
Payer: MEDICARE

## 2024-01-02 VITALS — OXYGEN SATURATION: 100 % | RESPIRATION RATE: 18 BRPM | TEMPERATURE: 98 F | HEART RATE: 70 BPM

## 2024-01-02 DIAGNOSIS — S22.31XA CLOSED FRACTURE OF ONE RIB OF RIGHT SIDE, INITIAL ENCOUNTER: Primary | ICD-10-CM

## 2024-01-02 DIAGNOSIS — R07.81 RIB PAIN ON RIGHT SIDE: ICD-10-CM

## 2024-01-02 PROCEDURE — 71101 X-RAY EXAM UNILAT RIBS/CHEST: CPT | Performed by: EMERGENCY MEDICINE

## 2024-01-02 PROCEDURE — 99213 OFFICE O/P EST LOW 20 MIN: CPT | Performed by: EMERGENCY MEDICINE

## 2024-01-02 NOTE — DISCHARGE INSTRUCTIONS
NO weights keep walking to keeps lungs expanded.   If IBUprofen (same ad advil and motrin) is working for you continue using it.   20 minutes for heat 10min break then 20min of cool compress (ice pack). Hot shower can be considered heat therapy. Follow up shower with an ice pack.   Heat and ice treatment can be done as a one time sessions a few times a day, but work best before bed.

## 2024-01-04 NOTE — ED PROVIDER NOTES
Patient Seen in: [facility]      History   No chief complaint on file.    Stated Complaint: right side pain    Subjective:   HPI  Kenya Malave is a 83 year old female here for 2 weeks of right rib pain after turning, and hyperextending while reaching for something.  Felt a sharp pain to her right lateral lower rib area.  No shortness of breath, chest pain, or other complaints.  Mild conservative treatment with some relief however she is still having symptoms of pain with coughing, sneezing, or certain movements.  Medical history includes but not limited to osteopenia, and HIV.  Otherwise well-appearing with no other complaints      Objective:   Past Medical History:   Diagnosis Date    HIV (human immunodeficiency virus infection) ([facility])     Osteopenia               Past Surgical History:   Procedure Laterality Date    BACK SURGERY      32 years ago, and 5 years ago                      Social History     Socioeconomic History    Marital status: Single   Tobacco Use    Smoking status: Former     Types: Cigarettes     Quit date:      Years since quittin.0    Smokeless tobacco: Never   Vaping Use    Vaping Use: Never used   Substance and Sexual Activity    Alcohol use: Not Currently     Comment: 1-2 drinks a month    Drug use: Never              Review of Systems    Positive for stated complaint: right side pain  Other systems are as noted in HPI.  Constitutional and vital signs reviewed.      All other systems reviewed and negative except as noted above.    Physical Exam     ED Triage Vitals [24 1139]   BP    Pulse 70   Resp 18   Temp 98.2 °F (36.8 °C)   Temp src Temporal   SpO2 100 %   O2 Device None (Room air)       Current:Pulse 70   Temp 98.2 °F (36.8 °C) (Temporal)   Resp 18   SpO2 100%         Physical Exam  Vitals and nursing note reviewed.   Constitutional:       General: She is not in acute distress.     Appearance: Normal appearance. She is not  toxic-appearing.   HENT:      Head: Normocephalic.      Nose: Nose normal.   Eyes:      Conjunctiva/sclera: Conjunctivae normal.      Pupils: Pupils are equal, round, and reactive to light.   Cardiovascular:      Rate and Rhythm: Normal rate.      Pulses: Normal pulses.   Pulmonary:      Effort: Pulmonary effort is normal. No respiratory distress.      Breath sounds: Normal breath sounds. No wheezing.   Abdominal:      General: Abdomen is flat.      Palpations: Abdomen is soft.      Tenderness: There is no abdominal tenderness. There is no guarding.   Musculoskeletal:      Cervical back: Normal range of motion.      Comments: Decreased range of motion with internal, and external rotation of the trunk.  Decreased range of motion with lateral flexion/adduction.   Tenderness over ribs 9, and 10.  Equal chest expansion.  No bruising, swelling, or skin color changes.  No crepitus.   Skin:     Capillary Refill: Capillary refill takes less than 2 seconds.   Neurological:      General: No focal deficit present.      Mental Status: She is alert and oriented to person, place, and time.   Psychiatric:         Mood and Affect: Mood normal.         Behavior: Behavior normal.         Thought Content: Thought content normal.         Judgment: Judgment normal.               ED Course   Labs Reviewed - No data to display                   MDM         Medical Decision Making  Differential diagnosis includes but not limited to stress fracture, cholecystitis, constipation, contusion, or somatic causes symptoms.  Patient does not believe that symptoms are related to pulmonary embolism, and declined further workup, and/or higher level of care.  She is not hypoxic, tachypneic, tachycardic, or hypotensive.    Treat for rib fracture.  Primary care follow-up as needed; she is looking for PCP.  Dr. Coates given for follow-up.  Continue to ice, and splint if she needs to cough/sneeze.  Also discussed intermittent heat ans ice for 20 minutes with  20min between sessions. Do this a few times a day.   OTC pain control discussed.  Strict ER precautions, and reassurance given.  No acute distress, cleared for discharge home.    Problems Addressed:  Closed fracture of one rib of right side, initial encounter: acute illness or injury  Rib pain on right side: acute illness or injury    Amount and/or Complexity of Data Reviewed  External Data Reviewed: notes.  Radiology: ordered and independent interpretation performed. Decision-making details documented in ED Course.     Details: Great radiology.  10th rib fracture noted.  Patient having tenderness over 10th rib on exam    Risk  OTC drugs.        Disposition and Plan     Clinical Impression:  1. Closed fracture of one rib of right side, initial encounter    2. Rib pain on right side         Disposition:  Discharge  1/2/2024  1:00 pm    Follow-up:  Sanjay Howell  [address]  [facility] [place] [address]  [phone]          Wiley Johns MD  [address] [facility] [address] [place] [address] [id number] [phone]    Schedule an appointment as soon as possible for a visit in 1 month  follow up., As needed    Rayna Coates MD  [address]  [facility] [id number] [phone]    Schedule an appointment as soon as possible for a visit   follow up., new pcp          Medications Prescribed:  Discharge Medication List as of 1/2/2024  1:00 PM

## 2024-01-22 PROBLEM — N95.8 GENITOURINARY SYNDROME OF MENOPAUSE: Status: ACTIVE | Noted: 2022-02-23

## 2024-01-22 PROBLEM — I10 ESSENTIAL HYPERTENSION: Status: ACTIVE | Noted: 2019-02-28

## 2024-01-22 PROBLEM — G91.2 NPH (NORMAL PRESSURE HYDROCEPHALUS) (HCC): Status: ACTIVE | Noted: 2021-07-27

## 2024-01-22 PROBLEM — R07.2 PRECORDIAL PAIN: Status: ACTIVE | Noted: 2019-01-07

## 2024-01-22 PROBLEM — F41.9 ANXIETY: Status: ACTIVE | Noted: 2021-11-01

## 2024-01-22 PROBLEM — R32 URINARY INCONTINENCE: Status: ACTIVE | Noted: 2021-08-26

## 2024-01-22 PROBLEM — M48.062 SPINAL STENOSIS OF LUMBAR REGION WITH NEUROGENIC CLAUDICATION: Status: ACTIVE | Noted: 2018-07-02

## 2024-01-23 ENCOUNTER — OFFICE VISIT (OUTPATIENT)
Facility: CLINIC | Age: 84
End: 2024-01-23
Payer: MEDICARE

## 2024-01-23 ENCOUNTER — LAB ENCOUNTER (OUTPATIENT)
Dept: LAB | Facility: REFERENCE LAB | Age: 84
End: 2024-01-23
Attending: FAMILY MEDICINE
Payer: MEDICARE

## 2024-01-23 VITALS
BODY MASS INDEX: 22.28 KG/M2 | DIASTOLIC BLOOD PRESSURE: 56 MMHG | WEIGHT: 132.13 LBS | SYSTOLIC BLOOD PRESSURE: 104 MMHG | OXYGEN SATURATION: 97 % | HEART RATE: 82 BPM | HEIGHT: 64.5 IN

## 2024-01-23 DIAGNOSIS — M85.859 OSTEOPENIA OF NECK OF FEMUR, UNSPECIFIED LATERALITY: Primary | ICD-10-CM

## 2024-01-23 DIAGNOSIS — R32 URINARY INCONTINENCE, UNSPECIFIED TYPE: ICD-10-CM

## 2024-01-23 DIAGNOSIS — E03.9 HYPOTHYROIDISM, UNSPECIFIED TYPE: ICD-10-CM

## 2024-01-23 DIAGNOSIS — Z78.9 VEGAN DIET: ICD-10-CM

## 2024-01-23 DIAGNOSIS — K21.9 GASTROESOPHAGEAL REFLUX DISEASE WITHOUT ESOPHAGITIS: ICD-10-CM

## 2024-01-23 DIAGNOSIS — R07.2 PRECORDIAL PAIN: ICD-10-CM

## 2024-01-23 DIAGNOSIS — F41.9 ANXIETY: ICD-10-CM

## 2024-01-23 DIAGNOSIS — E78.00 HYPERCHOLESTEROLEMIA: ICD-10-CM

## 2024-01-23 DIAGNOSIS — G91.2 NPH (NORMAL PRESSURE HYDROCEPHALUS) (HCC): ICD-10-CM

## 2024-01-23 DIAGNOSIS — H90.3 SENSORY HEARING LOSS, BILATERAL: ICD-10-CM

## 2024-01-23 DIAGNOSIS — G31.84 MILD COGNITIVE IMPAIRMENT: ICD-10-CM

## 2024-01-23 DIAGNOSIS — B20 HUMAN IMMUNODEFICIENCY VIRUS (HIV) DISEASE (HCC): ICD-10-CM

## 2024-01-23 DIAGNOSIS — F32.A DEPRESSION, UNSPECIFIED DEPRESSION TYPE: ICD-10-CM

## 2024-01-23 DIAGNOSIS — S32.010A COMPRESSION FRACTURE OF L1 VERTEBRA, INITIAL ENCOUNTER (HCC): ICD-10-CM

## 2024-01-23 DIAGNOSIS — M85.859 OSTEOPENIA OF NECK OF FEMUR, UNSPECIFIED LATERALITY: ICD-10-CM

## 2024-01-23 DIAGNOSIS — I10 ESSENTIAL HYPERTENSION: ICD-10-CM

## 2024-01-23 DIAGNOSIS — S22.31XA CLOSED FRACTURE OF ONE RIB OF RIGHT SIDE, INITIAL ENCOUNTER: ICD-10-CM

## 2024-01-23 PROBLEM — N39.3 SUI (STRESS URINARY INCONTINENCE, FEMALE): Status: ACTIVE | Noted: 2024-01-12

## 2024-01-23 LAB
ANION GAP SERPL CALC-SCNC: 5 MMOL/L (ref 0–18)
BUN BLD-MCNC: 28 MG/DL (ref 9–23)
BUN/CREAT SERPL: 21.9 (ref 10–20)
CALCIUM BLD-MCNC: 10.2 MG/DL (ref 8.7–10.4)
CHLORIDE SERPL-SCNC: 107 MMOL/L (ref 98–112)
CO2 SERPL-SCNC: 28 MMOL/L (ref 21–32)
CREAT BLD-MCNC: 1.28 MG/DL
EGFRCR SERPLBLD CKD-EPI 2021: 42 ML/MIN/1.73M2 (ref 60–?)
FASTING STATUS PATIENT QL REPORTED: NO
GLUCOSE BLD-MCNC: 111 MG/DL (ref 70–99)
OSMOLALITY SERPL CALC.SUM OF ELEC: 296 MOSM/KG (ref 275–295)
POTASSIUM SERPL-SCNC: 4 MMOL/L (ref 3.5–5.1)
SODIUM SERPL-SCNC: 140 MMOL/L (ref 136–145)
VIT B12 SERPL-MCNC: >2000 PG/ML (ref 211–911)
VIT D+METAB SERPL-MCNC: 30 NG/ML (ref 30–100)

## 2024-01-23 PROCEDURE — 36415 COLL VENOUS BLD VENIPUNCTURE: CPT

## 2024-01-23 PROCEDURE — 99204 OFFICE O/P NEW MOD 45 MIN: CPT | Performed by: FAMILY MEDICINE

## 2024-01-23 PROCEDURE — 80048 BASIC METABOLIC PNL TOTAL CA: CPT

## 2024-01-23 PROCEDURE — 82607 VITAMIN B-12: CPT

## 2024-01-23 PROCEDURE — 82306 VITAMIN D 25 HYDROXY: CPT

## 2024-01-23 PROCEDURE — 99499 UNLISTED E&M SERVICE: CPT | Performed by: FAMILY MEDICINE

## 2024-01-23 RX ORDER — ALENDRONATE SODIUM 70 MG/1
70 TABLET ORAL
Qty: 12 TABLET | Refills: 3 | Status: SHIPPED | OUTPATIENT
Start: 2024-01-23 | End: 2024-04-22

## 2024-01-23 NOTE — PROGRESS NOTES
Subjective:   Kenya Malave is a 83 year old female who presents for Establish Care (Patient wants to follow up from a visit to urgent care on 1/2/24 due to having right side rib fracture. )     Patient presents to establish care    Right sided rib fracture, Osteopenia. History of Compression fracture of L1  Diagnosed in urgent care 1/2/2024. No trauma no falls. Patient was reaching for something. Does have history of osteopenia and old compression fracture. Last Dexa visible in care everywhere was in 2021. T score of femoral neck was -2.4 and risk of major fracture was 33%. Risk of hip fracture 14%. Patient was never told about considering bisphosphonate. Patient is open to doing so. Rib feeling fine. No pain. Able to breath normally.     Urinary incontinence  Getting botox injections with urogyn at [facility]. Does get them under anesthesia    HIV   Is on biktarvy. Recent normal CD4 count in 4/2023. HIV quant not detectable in 2023. PCP was managing since has had normal CD and undetectable quant for years.     Hypothyroid  Taking levothyroxine. Doing well. Last TSH in 4/20203 was normal.    HLD  On pravastatin doing well. Last LDL was 100 in April 2023    Vegetarian/vegan diet  Does take b12 supplement. Normal in 4/2023.    GERD  Is on omeprazole. Is trying to wean down. Has started to take every other day    HTN  Doing well on losartan    Percordial chest pain  Never ended up needing to take nitroglycerine. Has not had any issues with chest pain since. Does have in her medicine cabinet and aware to take if needed    NPH/Memory issues/Anxiety  Did see Behavioral Neurology/Cognitive neurologist Dr Rodriguez at [facility] in January 2023. Patient is not a shunt candidate. Did also see neuropsych in June 2022 with normal evaluation. Did get a spinal tap which was not helpful. Per neuro note \"CSF negative for A D.\" Some of memory issues thought to be due to anxiety. Patient is following with psychologist  and doing well. Is seeing them once a month.     History/Other:    Chief Complaint Reviewed and Verified  Nursing Notes Reviewed and   Verified  Tobacco Reviewed  Allergies Reviewed  Medications Reviewed    Medical History Reviewed  Surgical History Reviewed  Family History   Reviewed  Social History Reviewed         Tobacco:  She smoked tobacco in the past but quit greater than 12 months ago.  Social History    Tobacco Use      Smoking status: Former        Types: Cigarettes        Quit date:         Years since quittin.0      Smokeless tobacco: Never       Current Outpatient Medications   Medication Sig Dispense Refill    alendronate 70 MG Oral Tab Take 1 tablet (70 mg total) by mouth every 7 days. 12 tablet 3    nitroglycerin 0.4 MG Sublingual SL Tab Place 1 tablet (0.4 mg total) under the tongue every 5 (five) minutes as needed for Chest pain.      losartan 100 MG Oral Tab       Bictegravir-Emtricitab-Tenofov (BIKTARVY) -25 MG Oral Tab Take 1 tablet by mouth daily.      buPROPion  MG Oral Tablet 24 Hr bupropion HCl  mg 24 hr tablet, extended release   1 tab PO daily      omeprazole 20 MG Oral Capsule Delayed Release Take 1 capsule (20 mg total) by mouth daily as needed.      estradiol 0.1 MG/GM Vaginal Cream Place 1 Application vaginally As Directed.      HYDROcodone-acetaminophen 5-325 MG Oral Tab Take 1 tablet by mouth every 4 (four) hours as needed.      levothyroxine 150 MCG Oral Tab levothyroxine 150 mcg tablet   1 tab PO daily      pravastatin 20 MG Oral Tab pravastatin 20 mg tablet   1 tab PO daily           Review of Systems:  Review of Systems   Constitutional: Negative.    HENT: Negative.     Eyes: Negative.    Respiratory: Negative.     Cardiovascular: Negative.    Gastrointestinal: Negative.    Genitourinary: Negative.    Musculoskeletal: Negative.    Skin: Negative.    Neurological: Negative.    Psychiatric/Behavioral: Negative.           Objective:   /56  (BP Location: Left arm, Patient Position: Sitting, Cuff Size: adult)   Pulse 82   Ht 5' 4.5\" (1.638 m)   Wt 132 lb 2 oz (59.9 kg)   SpO2 97%   BMI 22.33 kg/m²  Estimated body mass index is 22.33 kg/m² as calculated from the following:    Height as of this encounter: 5' 4.5\" (1.638 m).    Weight as of this encounter: 132 lb 2 oz (59.9 kg).    BP Readings from Last 3 Encounters:   01/23/24 104/56   06/09/23 126/73   06/03/23 120/63      Physical Exam  Vitals and nursing note reviewed.   Constitutional:       General: She is not in acute distress.     Appearance: Normal appearance.   HENT:      Head: Normocephalic and atraumatic.   Eyes:      General:         Right eye: No discharge.         Left eye: No discharge.      Comments: Extraocular eye movements grossly intact   Pulmonary:      Effort: Pulmonary effort is normal.   Musculoskeletal:      Comments: Normal gait    No tenderness along right side of rib cage   Skin:     Findings: No rash.   Neurological:      General: No focal deficit present.      Mental Status: She is alert. Mental status is at baseline.   Psychiatric:         Mood and Affect: Mood normal.         Behavior: Behavior normal.         Assessment & Plan:   1. Osteopenia of neck of femur, unspecified laterality (Primary), 2. Compression fracture of L1 vertebra, initial encounter ([facility]), 3. Closed fracture of one rib of right side, initial encounter  -     XR DEXA BONE DENSITOMETRY (CPT=77080); Future; Expected date: 01/23/2024  -     Basic Metabolic Panel (8); Future; Expected date: 01/23/2024  -     Vitamin D; Future; Expected date: 01/23/2024  -     Alendronate Sodium; Take 1 tablet (70 mg total) by mouth every 7 days.  Dispense: 12 tablet; Refill: 3    -after further discussion patient states was on alendronate in the past. Unsure of why it was stopped. This provider is suspicious was likely discontinued due to either duration met for therapy or no change in BMD. However, given now 2  fractures, will update DEXA and restart alendronate. The patient educated on disease process, medication use and side effects.    4. Urinary incontinence, unspecified type  -As per [facility] urogyn. Gets botox injections under sedation. Could not tolerate oral medications in the past due to side effects    5. Gastroesophageal reflux disease without esophagitis  -patient attempting to decrease use of omeprazole. Has decreased to every other day    6. NPH (normal pressure hydrocephalus) ([facility]), 7. Mild cognitive impairment  -Was following with [facility] Behavioral/Cognitive Neurology. S/p Therapudic LP. Not much improvement per patient. Per patient, neurology stated not much further to do. To continue with psych to address anxiety    8. Depression, unspecified depression type, 9. Anxiety  -as per psych    10. Human immunodeficiency virus (HIV) disease ([facility])  Overview per [facility]  Formatting of this note might be different from the original.   Originally dx'd 1/1996. Lowest cd4 was around 190. No hx of oi's. Hep a/b immune. Last pneumovax 2019  prevnar given 2015. Initially was on norvir monotherapy  switched to boosted prezista/truvada for years.  Switched off tfv to isentress plus boosted    prezista plus emtriva.       Switched to biktarvy for reduction in cardiac risk (associated with abacavir). Neg quantiferon 1/2019.      -to continue biktarvy. Will draw monitoring labs at annual exam. Patient prefers to follow with PCP for now. Did  patient if any abnormals on labs will have to refer to Infectious disease    11. Essential hypertension  -no change to current dose. Under goal of less than 140/90    12. Hypercholesterolemia  -doing well on pravastatin. Will check Lipids at annual     13. Precordial pain  -no issues with chest pain. Does have nitroglycerin to take if needed but has never taken    14. Hypothyroidism, unspecified type  -on levo. Doing well. Will check TSH at annual    15. Sensory  hearing loss, bilateral  -s/p cochlear implants.     16. Vegan diet  -     Vitamin B12; Future; Expected date: 01/23/2024    -ordered updated B12 level        Return in about 3 months (around 4/23/2024) for Annual exam.    Rayna Coates MD, 1/23/2024, 1:38 PM

## 2024-01-25 ENCOUNTER — TELEPHONE (OUTPATIENT)
Facility: CLINIC | Age: 84
End: 2024-01-25

## 2024-01-25 DIAGNOSIS — R79.89 ELEVATED SERUM CREATININE: Primary | ICD-10-CM

## 2024-01-25 NOTE — TELEPHONE ENCOUNTER
Patient Calcium and vitamin D normal. Creatinine elevated above baseline but not meeting criteria for MINESH. Plan to have patient hydrate and repeat BMP in 1-2 weeks.  No answer. LMTCB    Rayna Coates MD, 01/25/24, 9:00 AM     Called patient back. Relayed message above. Patient endorses not drinking much water. Will try and drink more water and come back in for repeat as planned    Rayna Coates MD, 01/25/24, 6:09 PM

## 2024-01-31 ENCOUNTER — PATIENT MESSAGE (OUTPATIENT)
Facility: CLINIC | Age: 84
End: 2024-01-31

## 2024-02-01 ENCOUNTER — APPOINTMENT (OUTPATIENT)
Dept: GENERAL RADIOLOGY | Age: 84
End: 2024-02-01
Attending: PHYSICIAN ASSISTANT
Payer: MEDICARE

## 2024-02-01 ENCOUNTER — HOSPITAL ENCOUNTER (OUTPATIENT)
Age: 84
Discharge: HOME | End: 2024-02-01
Payer: MEDICARE

## 2024-02-01 ENCOUNTER — NURSE TRIAGE (OUTPATIENT)
Facility: CLINIC | Age: 84
End: 2024-02-01

## 2024-02-01 VITALS
RESPIRATION RATE: 18 BRPM | OXYGEN SATURATION: 98 % | SYSTOLIC BLOOD PRESSURE: 119 MMHG | DIASTOLIC BLOOD PRESSURE: 69 MMHG | HEART RATE: 76 BPM | TEMPERATURE: 99 F

## 2024-02-01 DIAGNOSIS — M17.12 OSTEOARTHRITIS OF LEFT PATELLOFEMORAL JOINT: ICD-10-CM

## 2024-02-01 DIAGNOSIS — M17.12 PRIMARY OSTEOARTHRITIS OF LEFT KNEE: ICD-10-CM

## 2024-02-01 DIAGNOSIS — S23.41XA RIB SPRAIN, INITIAL ENCOUNTER: ICD-10-CM

## 2024-02-01 DIAGNOSIS — S80.02XA CONTUSION OF LEFT KNEE, INITIAL ENCOUNTER: Primary | ICD-10-CM

## 2024-02-01 DIAGNOSIS — S80.212A ABRASION OF LEFT KNEE, INITIAL ENCOUNTER: ICD-10-CM

## 2024-02-01 DIAGNOSIS — M85.89 OSTEOPENIA OF MULTIPLE SITES: ICD-10-CM
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
## 2024-02-01 PROCEDURE — 99214 OFFICE O/P EST MOD 30 MIN: CPT | Performed by: PHYSICIAN ASSISTANT

## 2024-02-01 PROCEDURE — 71101 X-RAY EXAM UNILAT RIBS/CHEST: CPT | Performed by: PHYSICIAN ASSISTANT

## 2024-02-01 PROCEDURE — 73562 X-RAY EXAM OF KNEE 3: CPT | Performed by: PHYSICIAN ASSISTANT

## 2024-02-01 NOTE — ED INITIAL ASSESSMENT (HPI)
Pt here with left knee and left rib pain , pt states she feel 1 day ago on the concrete and injured her left knee and left rib area pt states it hurts to move and feels balance is off, pt denies any head injury

## 2024-02-01 NOTE — TELEPHONE ENCOUNTER
Action Requested: Summary for Provider     []  Critical Lab, Recommendations Needed  [] Need Additional Advice  []   FYI    []   Need Orders  [] Need Medications Sent to Pharmacy  []  Other     SUMMARY: Patient's daughter in law calling to report patient fell in the parking lot yesterday. Patient complaining of left knee pain along with chest soreness. Daughter in law states patient complaining of pain when taking a deep breath; however is not experiencing SOB or chest pain.   Per protocol, patient to go to ED/IC. Daughter in law states she will take patient to IC in [place] for an evaluation.     Reason for call: Fall  Onset: 1/31            Reason for Disposition   Can't take a deep breath but no respiratory distress (e.g., hurts to take a deep breath)    Protocols used: Chest Injury-A-OH

## 2024-02-01 NOTE — TELEPHONE ENCOUNTER
From: Kenya Malave  To: Rayna Coates  Sent: 1/31/2024 4:55 PM CST  Subject: A new fracture?    Mildred Coates,  This afternoon I fell and besides scraping my knees, I may have fractured another rib. This morning I spent a few hours arranging a trip which starts February 12th. I plan to come to  tomorrow for an xray, unless it feels much better. Will you be working at  tomorrow and if so, what time?    Thank you,  Kenya

## 2024-02-01 NOTE — ED PROVIDER NOTES
Patient Seen in: [facility]      History     Chief Complaint   Patient presents with    Knee Pain    Fall     Stated Complaint: Knee Injury    Subjective:   HPI    Patient is a 83-year-old  female with hearing impairment, hypertension, hyperlipidemia, hypothyroidism, GERD, HIV, osteopenia, accompanied by granddaughter, presenting to immediate care for evaluation of acute left knee pain and left lateral rib pain status post mechanical fall yesterday.  Occurred while walking outside of library.  Fell from standing from uneven brick pavers/manhole.  Forward landing on her both knees.  Experiencing predominantly left knee pain with associated abrasion.  Tender to palpation left knee and knee range of motion and ambulation.  Difficulty ambulating secondary to injury.  In addition endorsing left rib pain.  Rib pain feels similar when had rib fracture last month from overreaching/stretching incident.  Took Advil for pain yesterday with minimal relief.  Coming to immediate care for further evaluation and x-ray imaging.  Accompanied by granddaughter who plan to a walker and cane for ambulation assistance.  Denies any injury or LOC's.  No other injuries or concerns    Objective:   Past Medical History:   Diagnosis Date    HIV (human immunodeficiency virus infection) ([facility])     Osteopenia               Past Surgical History:   Procedure Laterality Date    BACK SURGERY      32 years ago, and 5 years ago                      Social History     Socioeconomic History    Marital status: Single   Tobacco Use    Smoking status: Former     Types: Cigarettes     Quit date:      Years since quittin.1    Smokeless tobacco: Never   Vaping Use    Vaping Use: Never used   Substance and Sexual Activity    Alcohol use: Not Currently     Comment: 1-2 drinks a month    Drug use: Never              Review of Systems   Constitutional:  Positive for activity change.   Respiratory:  Negative for  shortness of breath.    Cardiovascular:  Negative for chest pain.   Gastrointestinal:  Negative for abdominal pain.   Genitourinary:  Negative for flank pain.   Musculoskeletal:  Positive for gait problem. Negative for back pain and neck pain.        Left rib pain    Left knee pain   Skin:  Positive for wound.        Left knee abrasion   Neurological:  Negative for dizziness, light-headedness and headaches.   Psychiatric/Behavioral:  Negative for confusion.        Positive for stated complaint: Knee Injury  Other systems are as noted in HPI.  Constitutional and vital signs reviewed.      All other systems reviewed and negative except as noted above.    Physical Exam     ED Triage Vitals [02/01/24 1009]   /69   Pulse 76   Resp 18   Temp 98.7 °F (37.1 °C)   Temp src Temporal   SpO2 98 %   O2 Device None (Room air)       Current:/69   Pulse 76   Temp 98.7 °F (37.1 °C) (Temporal)   Resp 18   SpO2 98%         Physical Exam  Vitals and nursing note reviewed.   Constitutional:       General: She is not in acute distress.     Appearance: Normal appearance. She is not ill-appearing, toxic-appearing or diaphoretic.   HENT:      Head: Normocephalic and atraumatic.      Mouth/Throat:      Mouth: Mucous membranes are moist.   Eyes:      Conjunctiva/sclera: Conjunctivae normal.   Cardiovascular:      Rate and Rhythm: Normal rate.      Pulses: Normal pulses.   Pulmonary:      Effort: Pulmonary effort is normal. No respiratory distress.      Breath sounds: Normal breath sounds.      Comments: Tenderness to palpation left lateral ribs at fourth-ninth ribs.  No obvious deformity or swelling.  No crepitus.  No hematoma.  No rash.  No ecchymosis. lungs clear.  Musculoskeletal:         General: Tenderness and signs of injury present.      Cervical back: Normal range of motion. No rigidity.      Comments: Tenderness to palpation left anterior knee including tibial joint line.  Good range of motion secondary to pain.   Tenderness to patella.  Superficial abrasions present without erythema or warmth or bleeding or drainage.  Capillary refill is 2 seconds.  No cool or warm extremity.   Neurological:      General: No focal deficit present.      Mental Status: She is alert and oriented to person, place, and time.      Gait: Gait abnormal.   Psychiatric:         Mood and Affect: Mood normal.         Behavior: Behavior normal.             ED Course   Labs Reviewed - No data to display  XR RIBS WITH CHEST (3 VIEWS), LEFT  (CPT=71101)   Final Result   PROCEDURE: XR RIBS WITH CHEST (3 VIEWS), LEFT (CPT=71101)       COMPARISON: [facility] in [place], XR RIBS WITH CHEST (3    VIEWS), RIGHT (CPT=71101), 1/02/2024, 11:56 AM.       INDICATIONS: Left lateral rib pain 4-9th. Fall injury.       TECHNIQUE:   Four views.         FINDINGS:        BONES: No acute left rib fracture.  Dextroscoliosis thoracic spine with    multilevel degenerative change.  Degenerative change also seen in the    lumbar spine with a levoscoliosis.  There is evidence of previous L1    kyphoplasty.   SOFT TISSUES: Negative.  No visible soft tissue swelling.     LUNGS: No focal opacity.   PLEURA: No pleural effusion.  No pneumothorax.   OTHER: The heart is not enlarged.  Tortuous thoracic aorta.                   =====   CONCLUSION:        No acute cardiopulmonary disease.  No evidence of acute left rib fracture.               Dictated by (CST): Eddie Guidry MD on 2/01/2024 at 11:12 AM        Finalized by (CST): Eddie Guidry MD on 2/01/2024 at 11:14 AM               XR KNEE (3 VIEWS), LEFT (CPT=73562)   Final Result   PROCEDURE: XR KNEE ROUTINE (3 VIEWS), LEFT (CPT=73562)       COMPARISON: None.       INDICATIONS: Left knee and joint pain. Fall injury.       TECHNIQUE: 3 views were obtained.         FINDINGS:    BONES: No acute fracture or dislocation.  Osteopenia.  Mild medial joint    space narrowing.  Severe patellofemoral joint space narrowing.   Early    marginal osteophyte formation.   SOFT TISSUES: Negative. No visible soft tissue swelling.    EFFUSION: None visible.    OTHER: Negative.                    =====   CONCLUSION:        No acute fracture or dislocation.       Left knee osteoarthritis most significant and severe in the patellofemoral    compartment.       Osteopenia.               Dictated by (CST): Eddie Guidry MD on 2/01/2024 at 11:11 AM        Finalized by (CST): Eddie Guidry MD on 2/01/2024 at 11:12 AM                           MDM     Patient is a 83-year-old female with history above, presenting to immediate care for evaluation of acute traumatic left knee pain and left rib pain status post mechanical fall from standing yesterday.  Patient tender to palpation left lateral ribs without deformity.  Normal cardiac and lung exam.  X-ray imaging left ribs obtained negative for acute displaced left rib fracture on x-ray imaging.  No pneumothorax.  In addition patient with acute traumatic left knee pain with tender to palpation left anterior knee with soft tissue swelling and knee abrasion present without signs of infection.  Does have limited range of motion.  No obvious effusion on exam.  Neurovascular intact and compartments soft.  X-ray imaging left knee negative for left knee acute fracture or dislocation.  Osteopenia present.  Left knee osteoarthritis with severe patellofemoral osteoarthritis.  Discussed imaging results with patient and granddaughter.  Will treat outpatient supportively.  Pain management.  Ace wrap for comfort.  Activity as tolerated.  Light activity.  Will obtain walker and cane for ambulation assistance.  Wound care provided by RN.  Tetanus is up-to-date.  PCP follow-up.     Medical Decision Making      Disposition and Plan     Clinical Impression:  1. Contusion of left knee, initial encounter    2. Fall, initial encounter    3. Osteopenia of multiple sites    4. Rib sprain, initial encounter    5. Primary  osteoarthritis of left knee    6. Osteoarthritis of left patellofemoral joint    7. Abrasion of left knee, initial encounter         Disposition:  Discharge  2/1/2024 11:34 am    Follow-up:  Rayna Coates MD  [address] [id number] [phone]                Medications Prescribed:  Current Discharge Medication List

## 2024-02-01 NOTE — DISCHARGE INSTRUCTIONS
May take Tylenol or Aleve as needed for pain. May use over-the-counter lidocaine patches or Aspercreme on ribs for additional pain management

## 2024-02-19 ENCOUNTER — PATIENT MESSAGE (OUTPATIENT)
Facility: CLINIC | Age: 84
End: 2024-02-19

## 2024-02-19 DIAGNOSIS — M25.561 CHRONIC PAIN OF BOTH KNEES: Primary | ICD-10-CM

## 2024-02-19 DIAGNOSIS — G89.29 CHRONIC PAIN OF BOTH KNEES: Primary | ICD-10-CM

## 2024-02-19 DIAGNOSIS — M25.562 CHRONIC PAIN OF BOTH KNEES: Primary | ICD-10-CM

## 2024-02-19 NOTE — TELEPHONE ENCOUNTER
From: Kenya Malave  To: Rayna Coates  Sent: 2/19/2024 8:33 AM CST  Subject: PT    Dear Dr. Murillo,    I am recovering from my January 31st fall but the recovery seems to now be pretty much at a standstill. Walking for 10 or so minutes is OK but afterwards both my knees hurt. So I ice them. Heat feels good while I am doing it but afterwards the pain is worse so I have stopped using heat.     Someone suggested physical therapy. What do you think? If you agree that might be beneficial, could you please write whatever I need so that it will be covered by Medicare?     Thank you,  Kenya Malave

## 2024-03-04 ENCOUNTER — APPOINTMENT (OUTPATIENT)
Dept: PHYSICAL THERAPY | Facility: HOSPITAL | Age: 84
End: 2024-03-04
Attending: FAMILY MEDICINE
Payer: MEDICARE

## 2024-03-08 ENCOUNTER — PATIENT MESSAGE (OUTPATIENT)
Facility: CLINIC | Age: 84
End: 2024-03-08

## 2024-03-12 ENCOUNTER — APPOINTMENT (OUTPATIENT)
Dept: PHYSICAL THERAPY | Facility: HOSPITAL | Age: 84
End: 2024-03-12
Attending: FAMILY MEDICINE
Payer: MEDICARE

## 2024-03-14 ENCOUNTER — APPOINTMENT (OUTPATIENT)
Dept: PHYSICAL THERAPY | Facility: HOSPITAL | Age: 84
End: 2024-03-14
Attending: FAMILY MEDICINE
Payer: MEDICARE

## 2024-03-15 ENCOUNTER — ORDER TRANSCRIPTION (OUTPATIENT)
Dept: ADMINISTRATIVE | Facility: HOSPITAL | Age: 84
End: 2024-03-15

## 2024-03-15 DIAGNOSIS — Z12.31 ENCOUNTER FOR SCREENING MAMMOGRAM FOR MALIGNANT NEOPLASM OF BREAST: Primary | ICD-10-CM

## 2024-03-19 ENCOUNTER — APPOINTMENT (OUTPATIENT)
Dept: PHYSICAL THERAPY | Facility: HOSPITAL | Age: 84
End: 2024-03-19
Attending: FAMILY MEDICINE
Payer: MEDICARE

## 2024-04-08 ENCOUNTER — PATIENT MESSAGE (OUTPATIENT)
Facility: CLINIC | Age: 84
End: 2024-04-08

## 2024-04-08 ENCOUNTER — HOSPITAL ENCOUNTER (OUTPATIENT)
Dept: BONE DENSITY | Facility: HOSPITAL | Age: 84
Discharge: HOME | End: 2024-04-08
Attending: FAMILY MEDICINE
Payer: MEDICARE

## 2024-04-08 DIAGNOSIS — S32.010A COMPRESSION FRACTURE OF L1 VERTEBRA, INITIAL ENCOUNTER (HCC): ICD-10-CM

## 2024-04-08 DIAGNOSIS — M85.859 OSTEOPENIA OF NECK OF FEMUR, UNSPECIFIED LATERALITY: ICD-10-CM

## 2024-04-08 PROCEDURE — 77080 DXA BONE DENSITY AXIAL: CPT | Performed by: FAMILY MEDICINE

## 2024-04-08 NOTE — TELEPHONE ENCOUNTER
From: Kenya Malave  To: Rayna Coates  Sent: 4/8/2024 8:38 AM CDT  Subject: blood draw test    I am having a blood draw today in preparation of my upcoming annual visit. This is the first time at your clinic. Please make sure that my CD4 and other related counts are included in the tests.    Thank you

## 2024-04-09 DIAGNOSIS — E78.00 HYPERCHOLESTEROLEMIA: ICD-10-CM

## 2024-04-09 DIAGNOSIS — E03.9 HYPOTHYROIDISM, UNSPECIFIED TYPE: ICD-10-CM

## 2024-04-09 DIAGNOSIS — Z00.00 ENCOUNTER FOR GENERAL ADULT MEDICAL EXAMINATION W/O ABNORMAL FINDINGS: Primary | ICD-10-CM

## 2024-04-09 DIAGNOSIS — B20 HUMAN IMMUNODEFICIENCY VIRUS (HIV) DISEASE (HCC): ICD-10-CM

## 2024-04-10 ENCOUNTER — LAB ENCOUNTER (OUTPATIENT)
Dept: LAB | Facility: REFERENCE LAB | Age: 84
End: 2024-04-10
Attending: FAMILY MEDICINE
Payer: MEDICARE

## 2024-04-10 DIAGNOSIS — R79.89 ELEVATED SERUM CREATININE: ICD-10-CM

## 2024-04-10 DIAGNOSIS — B20 HUMAN IMMUNODEFICIENCY VIRUS (HIV) DISEASE (HCC): ICD-10-CM

## 2024-04-10 DIAGNOSIS — E78.00 HYPERCHOLESTEROLEMIA: ICD-10-CM

## 2024-04-10 DIAGNOSIS — Z00.00 ENCOUNTER FOR GENERAL ADULT MEDICAL EXAMINATION W/O ABNORMAL FINDINGS: ICD-10-CM

## 2024-04-10 DIAGNOSIS — D64.9 ANEMIA, UNSPECIFIED TYPE: ICD-10-CM

## 2024-04-10 DIAGNOSIS — E03.9 HYPOTHYROIDISM, UNSPECIFIED TYPE: ICD-10-CM

## 2024-04-10 LAB
ANION GAP SERPL CALC-SCNC: 2 MMOL/L (ref 0–18)
BASOPHILS # BLD AUTO: 0.02 X10(3) UL (ref 0–0.2)
BASOPHILS NFR BLD AUTO: 0.3 %
BUN BLD-MCNC: 24 MG/DL (ref 9–23)
BUN/CREAT SERPL: 22.4 (ref 10–20)
CALCIUM BLD-MCNC: 10.3 MG/DL (ref 8.7–10.4)
CHLORIDE SERPL-SCNC: 106 MMOL/L (ref 98–112)
CHOLEST SERPL-MCNC: 194 MG/DL (ref ?–200)
CO2 SERPL-SCNC: 30 MMOL/L (ref 21–32)
CREAT BLD-MCNC: 1.07 MG/DL
DEPRECATED RDW RBC AUTO: 46.1 FL (ref 35.1–46.3)
EGFRCR SERPLBLD CKD-EPI 2021: 52 ML/MIN/1.73M2 (ref 60–?)
EOSINOPHIL # BLD AUTO: 0.14 X10(3) UL (ref 0–0.7)
EOSINOPHIL NFR BLD AUTO: 2.3 %
ERYTHROCYTE [DISTWIDTH] IN BLOOD BY AUTOMATED COUNT: 13.2 % (ref 11–15)
EST. AVERAGE GLUCOSE BLD GHB EST-MCNC: 123 MG/DL (ref 68–126)
FASTING PATIENT LIPID ANSWER: NO
FASTING STATUS PATIENT QL REPORTED: NO
GLUCOSE BLD-MCNC: 90 MG/DL (ref 70–99)
HBA1C MFR BLD: 5.9 % (ref ?–5.7)
HCT VFR BLD AUTO: 35 %
HDLC SERPL-MCNC: 73 MG/DL (ref 40–59)
HGB BLD-MCNC: 11.8 G/DL
IMM GRANULOCYTES # BLD AUTO: 0.01 X10(3) UL (ref 0–1)
IMM GRANULOCYTES NFR BLD: 0.2 %
LDLC SERPL CALC-MCNC: 109 MG/DL (ref ?–100)
LYMPHOCYTES # BLD AUTO: 2.54 X10(3) UL (ref 1–4)
LYMPHOCYTES NFR BLD AUTO: 42.5 %
MCH RBC QN AUTO: 32.1 PG (ref 26–34)
MCHC RBC AUTO-ENTMCNC: 33.7 G/DL (ref 31–37)
MCV RBC AUTO: 95.1 FL
MONOCYTES # BLD AUTO: 0.59 X10(3) UL (ref 0.1–1)
MONOCYTES NFR BLD AUTO: 9.9 %
NEUTROPHILS # BLD AUTO: 2.68 X10 (3) UL (ref 1.5–7.7)
NEUTROPHILS # BLD AUTO: 2.68 X10(3) UL (ref 1.5–7.7)
NEUTROPHILS NFR BLD AUTO: 44.8 %
NONHDLC SERPL-MCNC: 121 MG/DL (ref ?–130)
OSMOLALITY SERPL CALC.SUM OF ELEC: 290 MOSM/KG (ref 275–295)
PLATELET # BLD AUTO: 360 10(3)UL (ref 150–450)
POTASSIUM SERPL-SCNC: 4.5 MMOL/L (ref 3.5–5.1)
RBC # BLD AUTO: 3.68 X10(6)UL
SODIUM SERPL-SCNC: 138 MMOL/L (ref 136–145)
TRIGL SERPL-MCNC: 63 MG/DL (ref 30–149)
TSI SER-ACNC: 1.8 MIU/ML (ref 0.55–4.78)
VLDLC SERPL CALC-MCNC: 11 MG/DL (ref 0–30)
WBC # BLD AUTO: 6 X10(3) UL (ref 4–11)

## 2024-04-10 PROCEDURE — 87536 HIV-1 QUANT&REVRSE TRNSCRPJ: CPT

## 2024-04-10 PROCEDURE — 36415 COLL VENOUS BLD VENIPUNCTURE: CPT

## 2024-04-10 PROCEDURE — 80048 BASIC METABOLIC PNL TOTAL CA: CPT

## 2024-04-10 PROCEDURE — 85025 COMPLETE CBC W/AUTO DIFF WBC: CPT

## 2024-04-10 PROCEDURE — 84443 ASSAY THYROID STIM HORMONE: CPT

## 2024-04-10 PROCEDURE — 83036 HEMOGLOBIN GLYCOSYLATED A1C: CPT

## 2024-04-10 PROCEDURE — 86360 T CELL ABSOLUTE COUNT/RATIO: CPT

## 2024-04-10 PROCEDURE — 83540 ASSAY OF IRON: CPT

## 2024-04-10 PROCEDURE — 84466 ASSAY OF TRANSFERRIN: CPT

## 2024-04-10 PROCEDURE — 80061 LIPID PANEL: CPT

## 2024-04-10 PROCEDURE — 82728 ASSAY OF FERRITIN: CPT

## 2024-04-11 ENCOUNTER — TELEPHONE (OUTPATIENT)
Facility: CLINIC | Age: 84
End: 2024-04-11

## 2024-04-11 ENCOUNTER — PATIENT MESSAGE (OUTPATIENT)
Facility: CLINIC | Age: 84
End: 2024-04-11

## 2024-04-11 DIAGNOSIS — D64.9 ANEMIA, UNSPECIFIED TYPE: Primary | ICD-10-CM

## 2024-04-11 PROBLEM — I12.9 BENIGN HYPERTENSION WITH CHRONIC KIDNEY DISEASE, STAGE III (HCC): Status: ACTIVE | Noted: 2019-02-28

## 2024-04-11 PROBLEM — N18.30 BENIGN HYPERTENSION WITH CHRONIC KIDNEY DISEASE, STAGE III (HCC): Status: ACTIVE | Noted: 2019-02-28

## 2024-04-11 PROBLEM — N18.31 CKD STAGE 3A, GFR 45-59 ML/MIN (HCC): Status: ACTIVE | Noted: 2024-04-11

## 2024-04-11 PROBLEM — R73.03 PREDIABETES: Status: ACTIVE | Noted: 2024-04-11

## 2024-04-11 LAB
CD3+CD4+ CELLS # BLD: 1248 /MM3
CD3+CD4+ CELLS NFR BLD: 49 %
CD3+CD4+ CELLS/CD3+CD8+ CLL SPEC: 0.9
CD3+CD8+ CELLS NFR SPEC: 52 %
DEPRECATED HBV CORE AB SER IA-ACNC: 16.1 NG/ML
HIV 1 RNA PCR: <20 COPIES/ML
IRON SATN MFR SERPL: 22 %
IRON SERPL-MCNC: 110 UG/DL
TIBC SERPL-MCNC: 501 UG/DL (ref 250–425)
TRANSFERRIN SERPL-MCNC: 336 MG/DL (ref 250–380)

## 2024-04-11 NOTE — TELEPHONE ENCOUNTER
Pt viewed [facility].       Delivery Read From Message Type Attachments Subject   4/11/2024  9:21 AM Y Rayna Coates MD Patient Medical Advice Request  Lab results

## 2024-04-18 ENCOUNTER — PATIENT MESSAGE (OUTPATIENT)
Facility: CLINIC | Age: 84
End: 2024-04-18

## 2024-04-19 ENCOUNTER — NURSE TRIAGE (OUTPATIENT)
Facility: CLINIC | Age: 84
End: 2024-04-19

## 2024-04-19 DIAGNOSIS — M48.062 SPINAL STENOSIS OF LUMBAR REGION WITH NEUROGENIC CLAUDICATION: ICD-10-CM

## 2024-04-19 DIAGNOSIS — S32.010A COMPRESSION FRACTURE OF L1 VERTEBRA, INITIAL ENCOUNTER (HCC): Primary | ICD-10-CM

## 2024-04-19 RX ORDER — HYDROCODONE BITARTRATE AND ACETAMINOPHEN 5; 325 MG/1; MG/1
1 TABLET ORAL 2 TIMES DAILY PRN
Qty: 14 TABLET | Refills: 0 | Status: SHIPPED | OUTPATIENT
Start: 2024-04-19

## 2024-04-19 NOTE — TELEPHONE ENCOUNTER
Future Appointments   Date Time Provider Department Center   5/2/2024  3:20 PM [facility] RM1 [facility] EM [facility]   5/10/2024 10:00 AM Rayna Coates MD [facility] FP [facility] OP

## 2024-04-19 NOTE — TELEPHONE ENCOUNTER
Please offer patient 4:00 p.m. on Tuesday 4/23/24. Did send in short supply of norco to get her to appointment    Rayna Coates MD, 04/19/24, 11:08 AM

## 2024-04-19 NOTE — TELEPHONE ENCOUNTER
Action Requested: Summary for Provider     []  Critical Lab, Recommendations Needed  [] Need Additional Advice  []   FYI    []   Need Orders  [] Need Medications Sent to Pharmacy  [x]  Other: appointment request      SUMMARY: Per protocol disposition advised office visit within 3 days       Reason for call: Low Back Pain  Onset: 3-4 days     Patient sent [employer] message below: symptoms started 3-4 days ago. Pain is currently present, rating moderate and it is interfering with activities. Patient hs history of back pain and surgeries. Managing with ibuprofen right now but only dulls pain.     Dear Dr. Murillo,     A few days ago I had terrible lower back pain.  I took a couple of ibuprofen which helped.  During the night I suddenly woke up in great pain.  The ibuprofen did not help.   A few hours later I was still in pain so decided to take a hydrocodone; the pain subsided and I was able to sleep.   The hydrocodocone was the last pill from a 3 year old RX.   I’m now back on ibuprofen for my occasional back pain.   I’m writing you to request a new prescription; I realize it can be addictive so am very mindful to take it only when nothing else helps.   The label on the bottle says “hydrocodone/acetaminophen 5-325 TB”.   My drugstore is [facility] on [address] in [place].       Thank you,  Iris  Reason for Disposition   MODERATE back pain (e.g., interferes with normal activities) and present > 3 days    Protocols used: Back Pain-A-[place]    Dr. Coates: you have no appointments in the next week. Any ability to add on patient?     Patient availability: Today anytime, Monday 4/22 is not good, Tuesday afternoon is okay,  and anytime Wednesday, Thurs or Friday next week

## 2024-04-22 NOTE — TELEPHONE ENCOUNTER
Noted appreciate all the attempts to get this patient scheduled. If patient ever does call back tomorrow 4/23/24 at 4 p.m. will still be fine with me    Rayna Coates MD, 04/22/24, 4:17 PM

## 2024-04-22 NOTE — TELEPHONE ENCOUNTER
Left message to call back - offered appointment was not yet scheduled.     Patient needs to confirm that appointment time/date is okay.     Future Appointments   Date Time Provider Department Center   5/2/2024  3:20 PM [facility] RM1 [facility] EM [facility]   5/10/2024 10:00 AM Rayna Coates MD [facility] 14 FP [facility] 10 OP

## 2024-04-22 NOTE — TELEPHONE ENCOUNTER
Future Appointments   Date Time Provider Department Center   5/2/2024  3:20 PM [facility] RM1 [facility] EM [facility]   5/10/2024 10:00 AM Rayna Coates MD [facility] FP [facility] 10 OP

## 2024-04-29 ENCOUNTER — APPOINTMENT (OUTPATIENT)
Dept: CT IMAGING | Facility: HOSPITAL | Age: 84
End: 2024-04-29
Attending: EMERGENCY MEDICINE
Payer: MEDICARE

## 2024-04-29 ENCOUNTER — HOSPITAL ENCOUNTER (EMERGENCY)
Facility: HOSPITAL | Age: 84
Discharge: HOME | End: 2024-04-29
Attending: EMERGENCY MEDICINE
Payer: MEDICARE

## 2024-04-29 VITALS
TEMPERATURE: 98 F | WEIGHT: 135 LBS | OXYGEN SATURATION: 99 % | HEART RATE: 71 BPM | SYSTOLIC BLOOD PRESSURE: 146 MMHG | HEIGHT: 65 IN | DIASTOLIC BLOOD PRESSURE: 82 MMHG | BODY MASS INDEX: 22.49 KG/M2 | RESPIRATION RATE: 15 BRPM

## 2024-04-29 DIAGNOSIS — M54.50 LOW BACK PAIN AT MULTIPLE SITES: Primary | ICD-10-CM

## 2024-04-29 PROCEDURE — 99284 EMERGENCY DEPT VISIT MOD MDM: CPT

## 2024-04-29 PROCEDURE — 72131 CT LUMBAR SPINE W/O DYE: CPT | Performed by: EMERGENCY MEDICINE

## 2024-04-29 RX ORDER — METHYLPREDNISOLONE 4 MG/1
TABLET ORAL
Qty: 1 EACH | Refills: 0 | Status: SHIPPED | OUTPATIENT
Start: 2024-04-29

## 2024-04-29 NOTE — ED INITIAL ASSESSMENT (HPI)
Left low back pain for 2-3 days. Recently diagnosed with osteoporosis and has been doing new stretching/exercise routine. No urinary complaints. Denies f/n/v/d.

## 2024-04-29 NOTE — DISCHARGE INSTRUCTIONS
Return for changes or worsening.  Continue your ibuprofen and Norco as you normally have.  Follow-up with the physiatry group as soon as possible for further evaluation and possible additional imaging.  Read all instructions for further recommendations.

## 2024-04-29 NOTE — ED PROVIDER NOTES
Patient Seen in: [facility] Emergency Department      History     Chief Complaint   Patient presents with    Back Pain     Stated Complaint: Back Pain    Subjective:   83-year-old female with history of chronic intermittent back problems compression fracture and lumbar surgeries and kyphoplasty in the past here with increased intermittent low back pain for the last month or so.  It got better and then a few days ago her doctor told her to do some stretches and it seemed to get bad again.  It is located left lower paraspinal in the sacral area.  Usually worse in the morning when she gets out of bed and then as the day goes on it gets better.  Denies radiation weakness or numbness.  No incontinence.  No fever chills or recent procedures.  No falls.            Objective:   Past Medical History:    HIV (human immunodeficiency virus infection) (HCC)    Osteopenia              Past Surgical History:   Procedure Laterality Date    Back surgery      32 years ago, and 5 years ago          Implant cochlear device                  Social History     Socioeconomic History    Marital status: Single   Tobacco Use    Smoking status: Former     Current packs/day: 0.00     Types: Cigarettes     Quit date:      Years since quittin.3    Smokeless tobacco: Never   Vaping Use    Vaping status: Never Used   Substance and Sexual Activity    Alcohol use: Not Currently     Comment: 1-2 drinks a month    Drug use: Never              Review of Systems    Positive for stated complaint: Back Pain  Other systems are as noted in HPI.  Constitutional and vital signs reviewed.      All other systems reviewed and negative except as noted above.    Physical Exam     ED Triage Vitals [24 0808]   /69   Pulse 71   Resp 15   Temp 97.9 °F (36.6 °C)   Temp src Temporal   SpO2 98 %   O2 Device None (Room air)       Current:/82   Pulse 71   Temp 97.9 °F (36.6 °C) (Temporal)   Resp 15   Ht 165.1 cm (5' 5\")    Wt 61.2 kg   SpO2 99%   BMI 22.47 kg/m²         Physical Exam  HENT:      Head: Normocephalic.      Mouth/Throat:      Mouth: Mucous membranes are moist.      Pharynx: Oropharynx is clear.   Eyes:      Extraocular Movements: Extraocular movements intact.      Pupils: Pupils are equal, round, and reactive to light.   Cardiovascular:      Rate and Rhythm: Normal rate and regular rhythm.      Heart sounds: Normal heart sounds.   Pulmonary:      Effort: Pulmonary effort is normal.      Breath sounds: Normal breath sounds.   Abdominal:      Palpations: Abdomen is soft.      Tenderness: There is no abdominal tenderness.   Musculoskeletal:         General: No swelling or tenderness. Normal range of motion.      Cervical back: Normal range of motion.      Comments: No midline spinal tenderness left sacral tenderness.  No saddle anesthesia.  Distally neurovascular intact.   Lymphadenopathy:      Cervical: No cervical adenopathy.   Skin:     General: Skin is warm.      Capillary Refill: Capillary refill takes less than 2 seconds.   Neurological:      General: No focal deficit present.      Mental Status: She is alert.               ED Course   Labs Reviewed - No data to display       ED Course as of 04/29/24 1039  ------------------------------------------------------------  Time: 04/29 1037  Comment: CT lumbar spine shows spinal stenosis and neuroforaminal stenosis but no acute compression fractures.  Discussed with patient.  Recommend physiatry follow-up.  She has Norco.  I will give a Medrol Dosepak and anticipatory guidance was given she will return for changes or worsening or neurologic problems.  Questions were answered.              [facility]      CT SPINE LUMBAR (CPT=72131)    Result Date: 4/29/2024  CONCLUSION:   Remote L1 vertebral body fracture with prior kyphoplasty.  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   No acute fracture or vertebral body height loss.     Dictated by (CST): Dani Dunlap MD on 4/29/2024 at 9:48 AM     Finalized by (CST): Dani Dunlap MD on 4/29/2024 at 10:01 AM                                            Medical Decision Making  Patient here with intermittent increasing low back pain.  Increases in the morning and with certain movements but then gets better.  No recent trauma or fever.  Has some Norco's but does not like to take him.  Has used some ibuprofen.  Differential is vast but could include musculoskeletal, protrusion of disc, fracture, AAA and other processes do not seem as likely.  CT will be ordered.  She declines pain meds    Amount and/or Complexity of Data Reviewed  Radiology: ordered and independent interpretation performed. Decision-making details documented in ED Course.  Discussion of management or test interpretation with external provider(s): Please see ED course    Risk  Prescription drug management.        Disposition and Plan     Clinical Impression:  1. Low back pain at multiple sites         Disposition:  Discharge  4/29/2024 10:37 am    Follow-up:  Arnaud Mcdermott Y, DO  [address]  [facility] [address]  [phone]    Follow up      We recommend that you schedule follow up care with a primary care provider within the next three months to obtain basic health screening including reassessment of your blood pressure.      Medications Prescribed:  Current Discharge Medication List        START taking these medications    Details   methylPREDNISolone (MEDROL) 4 MG Oral Tablet Therapy Pack Dosepack: take as directed  Qty: 1 each, Refills: 0

## 2024-05-02 ENCOUNTER — HOSPITAL ENCOUNTER (OUTPATIENT)
Dept: MAMMOGRAPHY | Facility: HOSPITAL | Age: 84
Discharge: HOME | End: 2024-05-02
Attending: FAMILY MEDICINE
Payer: MEDICARE

## 2024-05-02 DIAGNOSIS — Z12.31 ENCOUNTER FOR SCREENING MAMMOGRAM FOR MALIGNANT NEOPLASM OF BREAST: ICD-10-CM

## 2024-05-02 PROCEDURE — 77067 SCR MAMMO BI INCL CAD: CPT | Performed by: FAMILY MEDICINE

## 2024-05-02 PROCEDURE — 77063 BREAST TOMOSYNTHESIS BI: CPT | Performed by: FAMILY MEDICINE

## 2024-05-07 ENCOUNTER — OFFICE VISIT (OUTPATIENT)
Dept: PHYSICAL MEDICINE AND REHAB | Facility: CLINIC | Age: 84
End: 2024-05-07

## 2024-05-07 VITALS — WEIGHT: 135 LBS | BODY MASS INDEX: 22.49 KG/M2 | HEIGHT: 65 IN | HEART RATE: 81 BPM | OXYGEN SATURATION: 98 %

## 2024-05-07 DIAGNOSIS — B20 HUMAN IMMUNODEFICIENCY VIRUS (HIV) DISEASE (HCC): ICD-10-CM

## 2024-05-07 DIAGNOSIS — M47.816 LUMBAR FACET ARTHROPATHY: Primary | ICD-10-CM

## 2024-05-07 DIAGNOSIS — F41.9 ANXIETY: ICD-10-CM

## 2024-05-07 DIAGNOSIS — G91.2 NPH (NORMAL PRESSURE HYDROCEPHALUS) (HCC): ICD-10-CM

## 2024-05-07 DIAGNOSIS — S32.010S COMPRESSION FRACTURE OF L1 VERTEBRA, SEQUELA: ICD-10-CM

## 2024-05-07 DIAGNOSIS — M81.0 AGE-RELATED OSTEOPOROSIS WITHOUT CURRENT PATHOLOGICAL FRACTURE: ICD-10-CM

## 2024-05-07 DIAGNOSIS — N18.31 CKD STAGE 3A, GFR 45-59 ML/MIN (HCC): ICD-10-CM

## 2024-05-07 DIAGNOSIS — K21.9 GASTROESOPHAGEAL REFLUX DISEASE WITHOUT ESOPHAGITIS: ICD-10-CM

## 2024-05-07 PROCEDURE — 99204 OFFICE O/P NEW MOD 45 MIN: CPT | Performed by: PHYSICAL MEDICINE & REHABILITATION

## 2024-05-07 RX ORDER — MELOXICAM 15 MG/1
15 TABLET ORAL DAILY
Qty: 14 TABLET | Refills: 0 | Status: SHIPPED | OUTPATIENT
Start: 2024-05-07 | End: 2024-05-21

## 2024-05-07 NOTE — PATIENT INSTRUCTIONS
-Mobic for 7 days  -Start PT and home exercises  -Ice/Heat as tolerated  -Follow up in 4 weeks  -If no better will get MRI

## 2024-05-09 ENCOUNTER — MED REC SCAN ONLY (OUTPATIENT)
Dept: PHYSICAL MEDICINE AND REHAB | Facility: CLINIC | Age: 84
End: 2024-05-09

## 2024-05-10 ENCOUNTER — LAB ENCOUNTER (OUTPATIENT)
Dept: LAB | Age: 84
End: 2024-05-10
Attending: FAMILY MEDICINE

## 2024-05-10 ENCOUNTER — OFFICE VISIT (OUTPATIENT)
Facility: CLINIC | Age: 84
End: 2024-05-10
Payer: MEDICARE

## 2024-05-10 VITALS
SYSTOLIC BLOOD PRESSURE: 130 MMHG | BODY MASS INDEX: 22 KG/M2 | OXYGEN SATURATION: 100 % | WEIGHT: 131.5 LBS | HEART RATE: 76 BPM | DIASTOLIC BLOOD PRESSURE: 66 MMHG

## 2024-05-10 DIAGNOSIS — G31.84 MILD COGNITIVE IMPAIRMENT: ICD-10-CM

## 2024-05-10 DIAGNOSIS — F41.9 ANXIETY: ICD-10-CM

## 2024-05-10 DIAGNOSIS — Z00.00 ENCOUNTER FOR ANNUAL HEALTH EXAMINATION: Primary | ICD-10-CM

## 2024-05-10 DIAGNOSIS — R41.3 MEMORY CHANGE: ICD-10-CM

## 2024-05-10 DIAGNOSIS — E03.9 HYPOTHYROIDISM, UNSPECIFIED TYPE: ICD-10-CM

## 2024-05-10 DIAGNOSIS — H90.3 SENSORY HEARING LOSS, BILATERAL: ICD-10-CM

## 2024-05-10 DIAGNOSIS — H81.13 BENIGN PAROXYSMAL POSITIONAL VERTIGO DUE TO BILATERAL VESTIBULAR DISORDER: ICD-10-CM

## 2024-05-10 DIAGNOSIS — I12.9 BENIGN HYPERTENSION WITH CHRONIC KIDNEY DISEASE, STAGE III (HCC): ICD-10-CM

## 2024-05-10 DIAGNOSIS — G91.2 NPH (NORMAL PRESSURE HYDROCEPHALUS) (HCC): ICD-10-CM

## 2024-05-10 DIAGNOSIS — M81.0 AGE-RELATED OSTEOPOROSIS WITHOUT CURRENT PATHOLOGICAL FRACTURE: ICD-10-CM

## 2024-05-10 DIAGNOSIS — F33.42 RECURRENT MAJOR DEPRESSIVE DISORDER, IN FULL REMISSION (HCC): ICD-10-CM

## 2024-05-10 DIAGNOSIS — M48.062 SPINAL STENOSIS OF LUMBAR REGION WITH NEUROGENIC CLAUDICATION: ICD-10-CM

## 2024-05-10 DIAGNOSIS — B20 HUMAN IMMUNODEFICIENCY VIRUS (HIV) DISEASE (HCC): ICD-10-CM

## 2024-05-10 DIAGNOSIS — S32.010A COMPRESSION FRACTURE OF L1 VERTEBRA, INITIAL ENCOUNTER (HCC): ICD-10-CM

## 2024-05-10 DIAGNOSIS — N18.30 BENIGN HYPERTENSION WITH CHRONIC KIDNEY DISEASE, STAGE III (HCC): ICD-10-CM

## 2024-05-10 DIAGNOSIS — E78.00 HYPERCHOLESTEROLEMIA: ICD-10-CM

## 2024-05-10 DIAGNOSIS — N39.3 SUI (STRESS URINARY INCONTINENCE, FEMALE): ICD-10-CM

## 2024-05-10 DIAGNOSIS — K21.9 GASTROESOPHAGEAL REFLUX DISEASE WITHOUT ESOPHAGITIS: ICD-10-CM

## 2024-05-10 DIAGNOSIS — R73.03 PREDIABETES: ICD-10-CM

## 2024-05-10 DIAGNOSIS — N18.31 CKD STAGE 3A, GFR 45-59 ML/MIN (HCC): ICD-10-CM

## 2024-05-10 DIAGNOSIS — R29.6 FALLS FREQUENTLY: ICD-10-CM

## 2024-05-10 LAB — VIT B12 SERPL-MCNC: >2000 PG/ML (ref 211–911)

## 2024-05-10 PROCEDURE — 36415 COLL VENOUS BLD VENIPUNCTURE: CPT

## 2024-05-10 PROCEDURE — 82607 VITAMIN B-12: CPT

## 2024-05-10 RX ORDER — ALENDRONATE SODIUM 70 MG/1
70 TABLET ORAL WEEKLY
COMMUNITY
Start: 2024-04-24

## 2024-05-10 NOTE — PATIENT INSTRUCTIONS
Kenya Malave's SCREENING SCHEDULE   Tests on this list are recommended by your physician but may not be covered, or covered at this frequency, by your insurer.   Please check with your insurance carrier before scheduling to verify coverage.   PREVENTATIVE SERVICES FREQUENCY &  COVERAGE DETAILS LAST COMPLETION DATE   Diabetes Screening    Fasting Blood Sugar /  Glucose    One screening every 12 months if never tested or if previously tested but not diagnosed with pre-diabetes   One screening every 6 months if diagnosed with pre-diabetes Lab Results   Component Value Date    GLU 90 04/10/2024        Cardiovascular Disease Screening    Lipid Panel  Cholesterol  Lipoprotein (HDL)  Triglycerides Covered every 5 years for all Medicare beneficiaries without apparent signs or symptoms of cardiovascular disease Lab Results   Component Value Date    CHOLEST 194 04/10/2024    HDL 73 (H) 04/10/2024     (H) 04/10/2024    TRIG 63 04/10/2024         Electrocardiogram (EKG)   Covered if needed at Welcome to Medicare, and non-screening if indicated for medical reasons -      Ultrasound Screening for Abdominal Aortic Aneurysm (AAA) Covered once in a lifetime for one of the following risk factors   • Men who are 65-75 years old and have ever smoked   • Anyone with a family history -     Colorectal Cancer Screening  Covered for ages 50-85; only need ONE of the following:    Colonoscopy   Covered every 10 years    Covered every 2 years if patient is at high risk or previous colonoscopy was abnormal -    No recommendations at this time    Flexible Sigmoidoscopy   Covered every 4 years -    Fecal Occult Blood Test Covered annually -   Bone Density Screening    Bone density screening    Covered every 2 years after age 65 if diagnosed with risk of osteoporosis or estrogen deficiency.    Covered yearly for long-term glucocorticoid medication use (Steroids) Last Dexa Scan:    XR DEXA BONE DENSITOMETRY (CPT=77080)  04/08/2024      No recommendations at this time   Pap and Pelvic    Pap   Covered every 2 years for women at normal risk; Annually if at high risk -  No recommendations at this time    Chlamydia Annually if high risk -  No recommendations at this time   Screening Mammogram    Mammogram     Recommend annually for all female patients aged 40 and older    One baseline mammogram covered for patients aged 35-39 05/02/2024    No recommendations at this time    Immunizations    Influenza Covered once per flu season  Please get every year 10/13/2023  No recommendations at this time    Pneumococcal Each vaccine ([id number] & [id number]) covered once after 65 Prevnar 13: 09/12/2015    [id number]: 01/07/2019     No recommendations at this time    Hepatitis B One screening covered for patients with certain risk factors   06/14/2006  No recommendations at this time    Tetanus Toxoid Not covered by Medicare Part B unless medically necessary (cut with metal); may be covered with your pharmacy prescription benefits -    Tetanus, Diptheria and Pertusis TD and TDaP Not covered by Medicare Part B -  No recommendations at this time    Zoster Not covered by Medicare Part B; may be covered with your pharmacy  prescription benefits -  No recommendations at this time     Annual Monitoring of Persistent Medications (ACE/ARB, digoxin diuretics, anticonvulsants)    Potassium Annually Lab Results   Component Value Date    K 4.5 04/10/2024         Creatinine   Annually Lab Results   Component Value Date    CREATSERUM 1.07 (H) 04/10/2024         BUN Annually Lab Results   Component Value Date    BUN 24 (H) 04/10/2024       Drug Serum Conc Annually No results found for: \"DIGOXIN\", \"DIG\", \"VALP\"

## 2024-05-10 NOTE — PROGRESS NOTES
Subjective:   Kenya Malave is a 83 year old female who presents for a Medicare Subsequent Annual Wellness visit (Pt already had Initial Annual Wellness) and scheduled follow up of multiple significant but stable problems and acute complicated new problem.     Patient presents for annual medicare wellness and has a few concerns    Low back pain with history of compression fracture and lumbar spinal stenosis  Remains 6-7 out of 10. Has seen PMR who recommended PT and Mobic. If no improvement in 4 weeks will proceed with MRI. Patient has gone to one PT session and did note worsening pain immediately after and the next morning. Patient has only taken 2 norcos out of supply sent for severe pain.    Falls  Patient has been falling more. No dizziness. More mechanical falls. Patient is already in PT.     Memory  Patient has noted worsening memory over the past 2 weeks. Does have known history of mild cognitive impairment. Does have history of NPH. No worsening bladder control. Does get botox for incontinence.     Stress incontinence  Well controlled with botox injections. Patient usually gets [facility]. Would like something more local    Prediabetes  Patient follows vegan diet. Sometimes vegetarian. Is working on cutting down on carbs. Has switched to alternative sweetener. Patient is eating eggs.     Hypothyroidism  Doing well. Recent TSH this year. Normal.     HTN with stage 3 CKD  Patient wondering if should do anything different with CKD diagnosis    HIV  Doing well on Biktarvy. Recent CD4 count normal and Viral load undetectable    HLD  On pravastatin. Recent LDL this year 109.     Osteoporosis  Patient is working to see if can get alendronate without blister packs as finds these difficult to open    Anxiety/depression  Patient has noted more stress with back pain as is concerned something else could be going on    GERD  Doing well on omeprazole    Sensory hearing loss  Patient wears hearing aids  bilaterally. Functioning well      History/Other:   Fall Risk Assessment:  She has been screened for Falls and is High Risk. Fall Prevention information provided to patient in After Visit Summary.    Do you feel unsteady when standing or walking?: No  Do you worry about falling?: No  Have you fallen in the past year?: Yes  How many times have you fallen?: (P) 3  Were you injured?: (P) Yes     Cognitive Assessment:   She had a completely normal cognitive assessment - see flowsheet entries     Functional Ability/Status:  Kenya Malave has some abnormal functions as listed below:  She has Hearing problems based on screening of functional status.She has Vision problems based on screening of functional status. She has problems with Memory based on screening of functional status.       Depression Screening (PHQ-2/PHQ-9):   PHQ-2 SCORE: 1  , done 5/2/2024   Feeling down, depressed, or hopeless: 1        Advanced Directives:   She does have a Living Will but we do NOT have it on file in Epic.    She does have a POA but we do NOT have it on file in Epic.    Discussed Advance Care Planning with patient (and family/surrogate if present). Standard forms made available to patient in After Visit Summary.      Patient Active Problem List   Diagnosis    Compression fracture of L1 vertebra, initial encounter ([facility])    Anxiety    BPPV (benign paroxysmal positional vertigo)    Depression    Benign hypertension with chronic kidney disease, stage III ([facility])    Genitourinary syndrome of menopause    Human immunodeficiency virus (HIV) disease ([facility])    Hypercholesterolemia    Hypothyroid    Spinal stenosis of lumbar region with neurogenic claudication    Mild cognitive impairment    NPH (normal pressure hydrocephalus) ([facility])    Age-related osteoporosis without current pathological fracture    Precordial pain    Sensory hearing loss, bilateral    Gastroesophageal reflux disease without esophagitis    KATHI (stress urinary incontinence,  female)    Prediabetes    CKD stage 3a, GFR 45-59 ml/min ([facility])     Allergies:  She is allergic to penicillins, chloramphenicol, and mirabegron.    Current Medications:  Outpatient Medications Marked as Taking for the 5/10/24 encounter (Office Visit) with Rayna Coates MD   Medication Sig    alendronate 70 MG Oral Tab Take 1 tablet (70 mg total) by mouth once a week.    Meloxicam (MOBIC) 15 MG Oral Tab Take 1 tablet (15 mg total) by mouth daily for 14 days.    methylPREDNISolone (MEDROL) 4 MG Oral Tablet Therapy Pack Dosepack: take as directed    HYDROcodone-acetaminophen 5-325 MG Oral Tab Take 1 tablet by mouth 2 (two) times daily as needed for Pain.    nitroglycerin 0.4 MG Sublingual SL Tab Place 1 tablet (0.4 mg total) under the tongue every 5 (five) minutes as needed for Chest pain.    losartan 100 MG Oral Tab     Bictegravir-Emtricitab-Tenofov (BIKTARVY) -25 MG Oral Tab Take 1 tablet by mouth daily.    buPROPion  MG Oral Tablet 24 Hr bupropion HCl  mg 24 hr tablet, extended release   1 tab PO daily    omeprazole 20 MG Oral Capsule Delayed Release Take 1 capsule (20 mg total) by mouth daily as needed.    levothyroxine 150 MCG Oral Tab levothyroxine 150 mcg tablet   1 tab PO daily    pravastatin 20 MG Oral Tab pravastatin 20 mg tablet   1 tab PO daily       Medical History:  She  has a past medical history of HIV (human immunodeficiency virus infection) ([facility]) () and Osteopenia.  Surgical History:  She  has a past surgical history that includes back surgery; ; and implant cochlear device.   Family History:  Her family history includes Breast Cancer (age of onset: 70) in her mother.  Social History:  She  reports that she quit smoking about 31 years ago. Her smoking use included cigarettes. She has never used smokeless tobacco. She reports that she does not currently use alcohol. She reports that she does not use drugs.    Tobacco:  She smoked tobacco in the past but quit greater than  12 months ago.  Social History     Tobacco Use   Smoking Status Former    Current packs/day: 0.00    Types: Cigarettes    Quit date:     Years since quittin.3   Smokeless Tobacco Never        CAGE Alcohol Screen:  CAGE screening score of 0 on 2024, showing low risk of alcohol abuse.      Patient Care Team:  Rayna Coates MD as PCP - General (Family Medicine)    Review of Systems   Constitutional: Negative.    HENT: Negative.     Eyes: Negative.    Respiratory: Negative.     Cardiovascular: Negative.    Gastrointestinal: Negative.    Genitourinary: Negative.    Musculoskeletal:  Positive for back pain.   Skin: Negative.    Neurological: Negative.    Psychiatric/Behavioral:          +memory issue         Objective:   Physical Exam  Vitals and nursing note reviewed.   Constitutional:       General: She is not in acute distress.     Appearance: Normal appearance. She is not ill-appearing.   HENT:      Head: Normocephalic and atraumatic.      Right Ear: Tympanic membrane normal. There is no impacted cerumen.      Left Ear: Tympanic membrane normal. There is no impacted cerumen.      Ears:      Comments: Bilateral hearing aids     Mouth/Throat:      Mouth: Mucous membranes are moist.      Pharynx: Oropharynx is clear. No oropharyngeal exudate or posterior oropharyngeal erythema.   Eyes:      General:         Right eye: No discharge.         Left eye: No discharge.      Extraocular Movements: Extraocular movements intact.      Pupils: Pupils are equal, round, and reactive to light.   Cardiovascular:      Rate and Rhythm: Normal rate and regular rhythm.      Heart sounds: Normal heart sounds. No murmur heard.     No friction rub. No gallop.   Pulmonary:      Effort: Pulmonary effort is normal.      Breath sounds: Normal breath sounds. No wheezing, rhonchi or rales.   Abdominal:      General: Abdomen is flat. Bowel sounds are normal. There is no distension.      Palpations: Abdomen is soft. There is no mass.       Tenderness: There is no abdominal tenderness. There is no guarding or rebound.   Musculoskeletal:         General: Normal range of motion.      Cervical back: Normal range of motion.      Right lower leg: No edema.      Left lower leg: No edema.      Comments: No tenderness to spine. No step-offs appreciated    +lumbar muscular tension   Skin:     General: Skin is warm and dry.      Findings: No rash.   Neurological:      General: No focal deficit present.      Mental Status: She is alert. Mental status is at baseline.   Psychiatric:         Mood and Affect: Mood normal.         Behavior: Behavior normal.           /66 (BP Location: Left arm, Patient Position: Sitting, Cuff Size: adult)   Pulse 76   Wt 131 lb 8 oz (59.6 kg)   SpO2 100%   BMI 21.88 kg/m²  Estimated body mass index is 21.88 kg/m² as calculated from the following:    Height as of 5/7/24: 5' 5\" (1.651 m).    Weight as of this encounter: 131 lb 8 oz (59.6 kg).    Medicare Hearing Assessment:   Hearing Screening    Screening Method: Finger Rub  Finger Rub Result: Pass         Visual Acuity:   Right Eye Visual Acuity: Corrected Right Eye Chart Acuity: 20/30   Left Eye Visual Acuity: Corrected Left Eye Chart Acuity: 20/30   Both Eyes Visual Acuity: Corrected Both Eyes Chart Acuity: 20/30   Able To Tolerate Visual Acuity: Yes        Assessment & Plan:   Kenya Malave is a 83 year old female who presents for a Medicare Assessment.     1. Encounter for annual health examination (Primary)  -     Detailed, Mod Complex (30077)    2. Memory change  -     MRI BRAIN (CPT=70551); Future; Expected date: 05/10/2024  -     Vitamin B12; Future; Expected date: 05/10/2024  -     Detailed, Mod Complex (09754)    -given history of NPH and worsening memory ordered MRI brain and B12. Other recent labs for memory including TSH normal. If MRI unchanged from previous Memory may be impacted by pain and/or anxiety will reassess once back pain under better  control    3. Mild cognitive impairment  Orders:  -     Vitamin B12; Future; Expected date: 05/10/2024  -     Detailed, Mod Complex ([id number])    -patient with known history of this but worsening. See plan above     4. Compression fracture of L1 vertebra, initial encounter ([facility])  -     Detailed, Mod Complex (67380)    -pain not well controlled. patient in physical therapy and following with PMR. Counseled patient to modify exercises that PT gave her to what she can tolerate. To inform PT at next session to see what modifications can be done to currently regimen. If no improvement after 4 weeks to return to PMR     5. Spinal stenosis of lumbar region with neurogenic claudication  -     Detailed, Mod Complex (58515)    -pain not well controlled. patient in physical therapy and following with PMR. Counseled patient to modify exercises that PT gave her to what she can tolerate. To inform PT at next session to see what modifications can be done to currently regimen. If no improvement after 4 weeks to return to PMR     6. NPH (normal pressure hydrocephalus) ([facility])  -     MRI BRAIN (CPT=70551); Future; Expected date: 05/10/2024  -     Detailed, Mod Complex (97007)    -well controlled/stable. Ordered MRI to ensure not worsening given worsening symptoms of falls and memory issues    7. Falls frequently  -     MRI BRAIN (CPT=70551); Future; Expected date: 05/10/2024  -     Detailed, Mod Complex (43248)    -to continue with PT. Also ordered MRI brain given history of NPH    8. KATHI (stress urinary incontinence, female)  -     Urology Referral - In Network  -     Detailed, Mod Complex (18082)    -placed referral to [facility] per patient request    9. Prediabetes  -     Detailed, Mod Complex (30596)    -well controlled. Patient already working on diet. Will recheck after July 2024    10. Hypothyroidism, unspecified type  -     Detailed, Mod Complex (67020)    -well controlled. Recent TSH level in 2024 normal    11. Benign  hypertension with chronic kidney disease, stage III ([facility])    -     Detailed, Mod Complex (24879)    -well controlled. BP under goal of less than 140/90. No change to current medication regimen    12. CKD stage 3a, GFR 45-59 ml/min ([facility])  -     Detailed, Mod Complex (32139)    -stable/well controlled. On losartan. Will continue to monitor renal function yearly    13. Human immunodeficiency virus (HIV) disease ([facility])  -     Detailed, Mod Complex (28832)    -well controlled. Recent normal CD4 count and undetectable viral load. To continue with biktarvy.     14. Hypercholesterolemia  -     Detailed, Mod Complex (98723)    -well controlled on statin. Recent LDL in 2024 done. NO change to statin dose    15. Age-related osteoporosis without current pathological fracture  Overview:  Started alendronate due to high fracture risk. 1/23/2024. Will consider therapy for 5-10 years SP  Orders:  -     Detailed, Mod Complex (40576)    -doing well. To continue alendronate    16. Anxiety  -     Detailed, Mod Complex (13301)    -not well controlled. May be contributing to memory issues. If persists after back pain is addressed will see if patient interested in any treatment      17. Recurrent major depressive disorder, in full remission ([facility])  Orders:  -     Detailed, Mod Complex (43465)    -well controlled. In remission per Phq today    18. Gastroesophageal reflux disease without esophagitis  -     Detailed, Mod Complex (59404)    -well controlled on PPI no change to current dose    19. Benign paroxysmal positional vertigo due to bilateral vestibular disorder  Orders:  -     Detailed, Mod Complex (76196)    -well controlled. No recent episodes    20. Sensory hearing loss, bilateral  Overview:   S/p cochlear implant 12/2012.  doing well  Orders:  -     Detailed, Mod Complex (24612)    The patient indicates understanding of these issues and agrees to the plan.  Reinforced healthy diet, lifestyle, and exercise.      Return in 4 weeks (on  6/7/2024).     Rayna Coates MD, 5/10/2024     Supplementary Documentation:   General Health:  In the past six months, have you lost more than 10 pounds without trying?: 2 - No  Has your appetite been poor?: No  Type of Diet: Vegetarian  How does the patient maintain a good energy level?: Appropriate Exercise;Daily Walks  How would you describe your daily physical activity?: Moderate  How would you describe your current health state?: Good  How do you maintain positive mental well-being?: Social Interaction;Puzzles;Visiting Friends;Visiting Family  On a scale of 0 to 10, with 0 being no pain and 10 being severe pain, what is your pain level?: 4 - (Moderate)  In the past six months, have you experienced urine leakage?: 0-No  At any time do you feel concerned for the safety/well-being of yourself and/or your children, in your home or elsewhere?: No  Have you had any immunizations at another office such as Influenza, Hepatitis B, Tetanus, or Pneumococcal?: Yes       Kenya Malave's SCREENING SCHEDULE   Tests on this list are recommended by your physician but may not be covered, or covered at this frequency, by your insurer.   Please check with your insurance carrier before scheduling to verify coverage.   PREVENTATIVE SERVICES FREQUENCY &  COVERAGE DETAILS LAST COMPLETION DATE   Diabetes Screening    Fasting Blood Sugar /  Glucose    One screening every 12 months if never tested or if previously tested but not diagnosed with pre-diabetes   One screening every 6 months if diagnosed with pre-diabetes Lab Results   Component Value Date    GLU 90 04/10/2024        Cardiovascular Disease Screening    Lipid Panel  Cholesterol  Lipoprotein (HDL)  Triglycerides Covered every 5 years for all Medicare beneficiaries without apparent signs or symptoms of cardiovascular disease Lab Results   Component Value Date    CHOLEST 194 04/10/2024    HDL 73 (H) 04/10/2024     (H) 04/10/2024    TRIG 63 04/10/2024          Electrocardiogram (EKG)   Covered if needed at Welcome to Medicare, and non-screening if indicated for medical reasons -      Ultrasound Screening for Abdominal Aortic Aneurysm (AAA) Covered once in a lifetime for one of the following risk factors    Men who are 65-75 years old and have ever smoked    Anyone with a family history -     Colorectal Cancer Screening  Covered for ages 50-85; only need ONE of the following:    Colonoscopy   Covered every 10 years    Covered every 2 years if patient is at high risk or previous colonoscopy was abnormal -    No recommendations at this time    Flexible Sigmoidoscopy   Covered every 4 years -    Fecal Occult Blood Test Covered annually -   Bone Density Screening    Bone density screening    Covered every 2 years after age 65 if diagnosed with risk of osteoporosis or estrogen deficiency.    Covered yearly for long-term glucocorticoid medication use (Steroids) Last Dexa Scan:    XR DEXA BONE DENSITOMETRY (CPT=77080) 04/08/2024      No recommendations at this time   Pap and Pelvic    Pap   Covered every 2 years for women at normal risk; Annually if at high risk -  No recommendations at this time    Chlamydia Annually if high risk -  No recommendations at this time   Screening Mammogram    Mammogram     Recommend annually for all female patients aged 40 and older    One baseline mammogram covered for patients aged 35-39 05/02/2024    No recommendations at this time    Immunizations    Influenza Covered once per flu season  Please get every year 10/13/2023  No recommendations at this time    Pneumococcal Each vaccine ([id number] & [id number]) covered once after 65 Prevnar 13: 09/12/2015    [id number]: 01/07/2019     No recommendations at this time    Hepatitis B One screening covered for patients with certain risk factors   06/14/2006  No recommendations at this time    Tetanus Toxoid Not covered by Medicare Part B unless medically necessary (cut with metal); may be covered with  your pharmacy prescription benefits -    Tetanus, Diptheria and Pertusis TD and TDaP Not covered by Medicare Part B -  No recommendations at this time    Zoster Not covered by Medicare Part B; may be covered with your pharmacy  prescription benefits -  No recommendations at this time     Annual Monitoring of Persistent Medications (ACE/ARB, digoxin diuretics, anticonvulsants)    Potassium Annually Lab Results   Component Value Date    K 4.5 04/10/2024         Creatinine   Annually Lab Results   Component Value Date    CREATSERUM 1.07 (H) 04/10/2024         BUN Annually Lab Results   Component Value Date    BUN 24 (H) 04/10/2024       Drug Serum Conc Annually No results found for: \"DIGOXIN\", \"DIG\", \"VALP\"

## 2024-05-14 NOTE — PROGRESS NOTES
[facility]  NEW PATIENT EVALUATION      HISTORY OF PRESENT ILLNESS:     Chief Complaint   Patient presents with    Low Back Pain     New L hand dominant patient presents for low back pain, gradual onset. Also states she started having pain after doing HEP. Denies N/T. Denies weakness. Denies radiating pain. Takes 3 ibuprofen for pain. CT Lumbar Spine 4/29/24. No tx. Pain 1/10.       The patient is a 83 year old female with significant past medical history of HIV, osteopenia, CKD, spinal stenosis, compression fracture, osteoporosis, anxiety, GERD who presents with low back pain.  Patient denies any specific injury or trauma.  Patient states the symptoms have been ongoing for the last several months.  Pain is recently progressed.  She denies any radiating symptoms, numbness tingling or weakness.  Pain is localized in the axial spine.  She takes ibuprofen for pain daily.  She has had CT imaging of the lumbar spine as noted below.  She rates her pain to be 1 out of 10 today.  But at other times the pain can be more severe.  She denies any changes in strength sensation or bowel bladder.  She has seen anesthesia for pain management.     PHYSICAL EXAM:   Pulse 81   Ht 65\"   Wt 135 lb (61.2 kg)   SpO2 98%   BMI 22.47 kg/m²     Gait Able to toe walk and heel walk without any difficulty    LUMBAR SPINE:  Inspection: no erythema, swelling, or obvious deformity.  Their iliac crest and shoulder heights are symmetrical.     Palpation: Non tender to palpation of the spinous process.   ROM: Treated with forward flexion and extension  Strength: 5/5 in bilateral lower extremities  Sensation: Intact to light touch in all dermatomes of the lower extremities  Reflexes: 1/4 at L4 and S1  Facet Loading: Positive bilateral lower lumbar facet joints  Straight leg raise: negative for radicular pain symptoms  Slump test: negative for pain symptoms for radicular pain symptoms      IMAGING:      Lumbar spine completed on 4/29/2024 was personally reviewed which is notable for varying degrees of spinal and foraminal narrowing throughout the lumbar spine.  There is degenerative disease throughout the lumbar spine.  There is sparing degree of bulging disks as well.  There is a remote L1 vertebral body fracture with history of prior kyphoplasty.    All imaging results were reviewed and discussed with patient.      ASSESSMENT/PLAN:     1. Lumbar facet arthropathy    2. Human immunodeficiency virus (HIV) disease ([facility])    3. NPH (normal pressure hydrocephalus) ([facility])    4. Compression fracture of L1 vertebra, sequela    5. Age-related osteoporosis without current pathological fracture    6. Anxiety    7. Gastroesophageal reflux disease without esophagitis    8. CKD stage 3a, GFR 45-59 ml/min ([facility])        Kenya Malave is a 83-year-old female presenting today for evaluation of low back pain secondary lumbar facet arthropathy.  She has history of HIV and other medical comorbidities as noted above.  I believe her symptoms are more related to lumbar facet arthropathy.  Recommend Mobic for short course given her CKD stage III, do not recommend this medication for long-term.  Recommend to use for 7 days.  She will start PT program with home exercises and follow-up with me in 4 weeks.  If pain is no better we will consider further treatment options.      The patient verbalized understanding with the plan and was in agreement. All questions/concerns were addressed and there were no barriers to learning.  Please note Dragon dictation software was used to dictate this note and may result in inadvertent typos.    Arnaud Mcdermott DO, FAAPMR & CAQSM  Physical Medicine and Rehabilitation  Sports and Spine Medicine    PAST MEDICAL HISTORY:     Past Medical History:    HIV (human immunodeficiency virus infection) ([facility])    Osteopenia         PAST SURGICAL HISTORY:     Past Surgical History:   Procedure Laterality Date    Back  surgery      32 years ago, and 5 years ago          Implant cochlear device           CURRENT MEDICATIONS:     Current Outpatient Medications   Medication Sig Dispense Refill    Meloxicam (MOBIC) 15 MG Oral Tab Take 1 tablet (15 mg total) by mouth daily for 14 days. 14 tablet 0    alendronate 70 MG Oral Tab Take 1 tablet (70 mg total) by mouth once a week.      methylPREDNISolone (MEDROL) 4 MG Oral Tablet Therapy Pack Dosepack: take as directed 1 each 0    HYDROcodone-acetaminophen 5-325 MG Oral Tab Take 1 tablet by mouth 2 (two) times daily as needed for Pain. 14 tablet 0    nitroglycerin 0.4 MG Sublingual SL Tab Place 1 tablet (0.4 mg total) under the tongue every 5 (five) minutes as needed for Chest pain.      losartan 100 MG Oral Tab       Bictegravir-Emtricitab-Tenofov (BIKTARVY) -25 MG Oral Tab Take 1 tablet by mouth daily.      buPROPion  MG Oral Tablet 24 Hr bupropion HCl  mg 24 hr tablet, extended release   1 tab PO daily      omeprazole 20 MG Oral Capsule Delayed Release Take 1 capsule (20 mg total) by mouth daily as needed.      levothyroxine 150 MCG Oral Tab levothyroxine 150 mcg tablet   1 tab PO daily      pravastatin 20 MG Oral Tab pravastatin 20 mg tablet   1 tab PO daily           ALLERGIES:     Allergies   Allergen Reactions    Penicillins HIVES and RASH    Chloramphenicol RASH     Unknown cleanser that was used prior to a surgery caused severe skin reaction    Mirabegron NAUSEA ONLY         FAMILY HISTORY:     Family History   Problem Relation Age of Onset    Breast Cancer Mother 70          SOCIAL HISTORY:     Social History     Socioeconomic History    Marital status: Single   Tobacco Use    Smoking status: Former     Current packs/day: 0.00     Types: Cigarettes     Quit date:      Years since quittin.3    Smokeless tobacco: Never   Vaping Use    Vaping status: Never Used   Substance and Sexual Activity    Alcohol use: Not Currently     Comment: 1-2 drinks a  month    Drug use: Never          REVIEW OF SYSTEMS:   Patient-reported ROS  Constitutional  Sleep Disturbance: denies  Chills: denies  Fever: denies  Weight Gain: denies  Weight Loss: denies   Cardiovascular  Chest Pain: denies  Irregular Heartbeat: denies   Respiratory  Painful Breathing: denies  Wheezing: denies   Gastrointestinal  Bowel Incontinence: denies  Heartburn: denies  Abdominal Pain: denies  Blood in Stool : denies  Rectal Pain: denies   Hematology  Easy Bruising: denies  Easy Bleeding: denies   Genitourinary  Difficulty Urinating: denies  Bladder Incontinence: denies  Pelvic Pain: denies  Painful Urination: denies   Musculoskeletal  Joint Stiffness: admits  Painful Joints: admits  Tailbone Pain: denies  Swollen Joints: denies   Peripheral Vascular  Swelling of Legs/Feet: denies  Cold Extremities: denies   Skin  Open Sores: denies  Nodules or Lumps: denies  Rash: denies   Neurological  Loss of Strength Since last Visit: admits  Tingling/Numbness: denies  Balance: admits   Psychiatric  Anxiety: admits  Depressed Mood: admits       PHYSICAL EXAM:   General: No immediate distress  Head: Normocephalic/ Atraumatic  Eyes: Extra-occular movements intact.   Ears: No auricular hematoma or deformities  Mouth: No lesions or ulcerations  Heart: peripheral pulses intact. Normal capillary refill.   Lungs: Non-labored respirations  Abdomen: No abdominal guarding  Extremities: No lower extremity edema bilaterally   Skin: No lesions noted   Cognition: alert & oriented x 3, attentive, able to follow 2 step commands, comprehention intact, spontaneous speech intact  Psychiatric: Mood and affect appropriate      LABS:     Lab Results   Component Value Date     04/10/2024    A1C 5.9 (H) 04/10/2024     Lab Results   Component Value Date    WBC 6.0 04/10/2024    RBC 3.68 (L) 04/10/2024    HGB 11.8 (L) 04/10/2024    HCT 35.0 04/10/2024    MCV 95.1 04/10/2024    MCH 32.1 04/10/2024    MCHC 33.7 04/10/2024    RDW 13.2  04/10/2024    .0 04/10/2024     Lab Results   Component Value Date    GLU 90 04/10/2024    BUN 24 (H) 04/10/2024    BUNCREA 22.4 (H) 04/10/2024    CREATSERUM 1.07 (H) 04/10/2024    ANIONGAP 2 04/10/2024    CA 10.3 04/10/2024    OSMOCALC 290 04/10/2024     04/10/2024    K 4.5 04/10/2024     04/10/2024    CO2 30.0 04/10/2024     Lab Results   Component Value Date    PTP 13.3 06/01/2023    INR 1.02 06/01/2023     Lab Results   Component Value Date    VITD 30.0 01/23/2024

## 2024-05-24 DIAGNOSIS — M81.0 AGE-RELATED OSTEOPOROSIS WITHOUT CURRENT PATHOLOGICAL FRACTURE: Primary | ICD-10-CM

## 2024-05-28 RX ORDER — ALENDRONATE SODIUM 70 MG/1
70 TABLET ORAL WEEKLY
Qty: 12 TABLET | Refills: 3 | Status: SHIPPED | OUTPATIENT
Start: 2024-05-28

## 2024-05-28 NOTE — TELEPHONE ENCOUNTER
Please Review. Protocol Failed; No Protocol   Patient external reported   No Active/ Future labs pended  Recent Visits  Date Type Provider Dept   05/10/24 Office Visit Rayna Coates MD [facility]       Requested Prescriptions   Pending Prescriptions Disp Refills    alendronate 70 MG Oral Tab  0     Sig: Take 1 tablet (70 mg total) by mouth once a week.       Osteoporosis Medication Protocol Failed - 5/25/2024  1:08 PM        Failed - CMP within the past 12 months        Passed - In person appointment or virtual visit in the past 6 mos or appointment in next 3 mos     Recent Outpatient Visits              2 weeks ago Encounter for annual health examination    [facility] Rayna Coates MD    Office Visit    3 weeks ago Lumbar facet arthropathy    [facility] Arnaud Mcdermott DO    Office Visit    4 months ago Osteopenia of neck of femur, unspecified laterality    [facility] Rayna Coates MD    Office Visit          Future Appointments         Provider Department Appt Notes    In 1 week Arnaud Mcdermott DO [facility] 4 wk f/u    In 3 weeks [facility] MRI [facility] (1.5T WIDE) [facility] MRI ok per Shayy, ar                    Passed - DEXA scan within past 2 years        Passed - Calcium level between 8.3 and 10.3     Lab Results   Component Value Date    CA 10.3 04/10/2024               Passed - GFR level greater than 35     GFR Evaluation  EGFRCR: 52 , resulted on 4/10/2024                 Future Appointments         Provider Department Appt Notes    In 1 week Arnaud Mcdermott DO [facility] 4 wk f/u    In 3 weeks [facility] MRI [facility] (1.5T WIDE) [facility] MRI ok per Shayy, ar          Recent Outpatient Visits              2 weeks ago Encounter for annual health examination    [facility]  [facility] Rayna Coates MD    Office Visit    3 weeks ago Lumbar facet arthropathy    [facility] [place] Arnaud Mcdermott,     Office Visit    4 months ago Osteopenia of neck of femur, unspecified laterality    [facility] Rayna Coates MD    Office Visit

## 2024-06-05 ENCOUNTER — OFFICE VISIT (OUTPATIENT)
Dept: PHYSICAL MEDICINE AND REHAB | Facility: CLINIC | Age: 84
End: 2024-06-05
Payer: MEDICARE

## 2024-06-05 VITALS — WEIGHT: 131 LBS | BODY MASS INDEX: 21.83 KG/M2 | HEIGHT: 65 IN

## 2024-06-05 DIAGNOSIS — F41.9 ANXIETY: ICD-10-CM

## 2024-06-05 DIAGNOSIS — S32.010S COMPRESSION FRACTURE OF L1 VERTEBRA, SEQUELA: ICD-10-CM

## 2024-06-05 DIAGNOSIS — B20 HUMAN IMMUNODEFICIENCY VIRUS (HIV) DISEASE (HCC): ICD-10-CM

## 2024-06-05 DIAGNOSIS — G91.2 NPH (NORMAL PRESSURE HYDROCEPHALUS) (HCC): ICD-10-CM

## 2024-06-05 DIAGNOSIS — N18.31 CKD STAGE 3A, GFR 45-59 ML/MIN (HCC): ICD-10-CM

## 2024-06-05 DIAGNOSIS — M47.816 LUMBAR FACET ARTHROPATHY: Primary | ICD-10-CM

## 2024-06-05 DIAGNOSIS — M81.0 AGE-RELATED OSTEOPOROSIS WITHOUT CURRENT PATHOLOGICAL FRACTURE: ICD-10-CM

## 2024-06-05 DIAGNOSIS — K21.9 GASTROESOPHAGEAL REFLUX DISEASE WITHOUT ESOPHAGITIS: ICD-10-CM

## 2024-06-05 PROCEDURE — 99213 OFFICE O/P EST LOW 20 MIN: CPT | Performed by: PHYSICAL MEDICINE & REHABILITATION

## 2024-06-05 NOTE — PROGRESS NOTES
[facility]  OFFICE FOLLOW UP EVALUATION      HISTORY OF PRESENT ILLNESS:     Chief Complaint   Patient presents with    Follow - Up     LOV 5/7/24. Patient f/u on low back. Pain 1/10. Patient states she is feeling 90% better. Patient is in PT with good relief. No pain meds. Denies N/T. Denies weakness.       Patient is following up for low back pain.  She states pain is improved greatly.  She has been doing home exercises.  She is feeling 90 to 100% better almost.  She has some mild soreness in the morning after her exercises.  She is not taking medication.  She denies any numbness tingling or weakness.  Overall she is very happy with her progress.    PHYSICAL EXAM:   Ht 65\"   Wt 131 lb (59.4 kg)   BMI 21.80 kg/m²     Gait Able to toe walk and heel walk without any difficulty     LUMBAR SPINE:  Inspection: no erythema, swelling, or obvious deformity.  Their iliac crest and shoulder heights are symmetrical.     Palpation: Non tender to palpation of the spinous process.   ROM: Treated with forward flexion and extension  Strength: 5/5 in bilateral lower extremities  Sensation: Intact to light touch in all dermatomes of the lower extremities  Reflexes: 1/4 at L4 and S1  Facet Loading: Positive bilateral lower lumbar facet joints  Straight leg raise: negative for radicular pain symptoms  Slump test: negative for pain symptoms for radicular pain symptoms    IMAGING:     CT Lumbar spine completed on 4/29/2024 was personally reviewed which is notable for varying degrees of spinal and foraminal narrowing throughout the lumbar spine. There is degenerative disease throughout the lumbar spine. There is sparing degree of bulging disks as well. There is a remote L1 vertebral body fracture with history of prior kyphoplasty.     All imaging results were reviewed and discussed with patient.      ASSESSMENT/PLAN:     1. Lumbar facet arthropathy    2. Human immunodeficiency virus  (HIV) disease ([facility])    3. NPH (normal pressure hydrocephalus) ([facility])    4. Compression fracture of L1 vertebra, sequela    5. Age-related osteoporosis without current pathological fracture    6. Anxiety    7. Gastroesophageal reflux disease without esophagitis    8. CKD stage 3a, GFR 45-59 ml/min ([facility])        Kenya Malave is a 83 year old female following up for back pain.  She has responded well to PT and home exercises.  Recommend she continue over-the-counter topical treatment and Tylenol as needed.  She will follow-up as needed in the future.      The patient verbalized understanding with the plan and was in agreement. All questions/concerns were addressed and there were no barriers to learning.  Please note Dragon dictation software was used to dictate this note and may result in inadvertent typos.    Arnaud Mcdermott DO, FAAPMR & CAQSM  Physical Medicine and Rehabilitation  Sports and Spine Medicine    PAST MEDICAL HISTORY:     Past Medical History:    HIV (human immunodeficiency virus infection) ([facility])    Osteopenia         PAST SURGICAL HISTORY:     Past Surgical History:   Procedure Laterality Date    Back surgery      32 years ago, and 5 years ago          Implant cochlear device           CURRENT MEDICATIONS:     Current Outpatient Medications   Medication Sig Dispense Refill    alendronate 70 MG Oral Tab Take 1 tablet (70 mg total) by mouth once a week. 12 tablet 3    methylPREDNISolone (MEDROL) 4 MG Oral Tablet Therapy Pack Dosepack: take as directed 1 each 0    nitroglycerin 0.4 MG Sublingual SL Tab Place 1 tablet (0.4 mg total) under the tongue every 5 (five) minutes as needed for Chest pain.      losartan 100 MG Oral Tab       Bictegravir-Emtricitab-Tenofov (BIKTARVY) -25 MG Oral Tab Take 1 tablet by mouth daily.      buPROPion  MG Oral Tablet 24 Hr bupropion HCl  mg 24 hr tablet, extended release   1 tab PO daily      omeprazole 20 MG Oral Capsule Delayed Release Take 1  capsule (20 mg total) by mouth daily as needed.      levothyroxine 150 MCG Oral Tab levothyroxine 150 mcg tablet   1 tab PO daily      pravastatin 20 MG Oral Tab pravastatin 20 mg tablet   1 tab PO daily           ALLERGIES:     Allergies   Allergen Reactions    Penicillins HIVES and RASH    Chloramphenicol RASH     Unknown cleanser that was used prior to a surgery caused severe skin reaction    Mirabegron NAUSEA ONLY         FAMILY HISTORY:     Family History   Problem Relation Age of Onset    Breast Cancer Mother 70          SOCIAL HISTORY:     Social History     Socioeconomic History    Marital status: Single   Tobacco Use    Smoking status: Former     Current packs/day: 0.00     Types: Cigarettes     Quit date:      Years since quittin.4    Smokeless tobacco: Never   Vaping Use    Vaping status: Never Used   Substance and Sexual Activity    Alcohol use: Not Currently     Comment: 1-2 drinks a month    Drug use: Never          REVIEW OF SYSTEMS:   A comprehensive 10 point review of systems was completed.  Pertinent positives and negatives noted in the the HPI.      LABS:     Lab Results   Component Value Date     04/10/2024    A1C 5.9 (H) 04/10/2024     Lab Results   Component Value Date    WBC 6.0 04/10/2024    RBC 3.68 (L) 04/10/2024    HGB 11.8 (L) 04/10/2024    HCT 35.0 04/10/2024    MCV 95.1 04/10/2024    MCH 32.1 04/10/2024    MCHC 33.7 04/10/2024    RDW 13.2 04/10/2024    .0 04/10/2024     Lab Results   Component Value Date    GLU 90 04/10/2024    BUN 24 (H) 04/10/2024    BUNCREA 22.4 (H) 04/10/2024    CREATSERUM 1.07 (H) 04/10/2024    ANIONGAP 2 04/10/2024    CA 10.3 04/10/2024    OSMOCALC 290 04/10/2024     04/10/2024    K 4.5 04/10/2024     04/10/2024    CO2 30.0 04/10/2024     Lab Results   Component Value Date    PTP 13.3 2023    INR 1.02 2023     Lab Results   Component Value Date    VITD 30.0 2024

## 2024-06-05 NOTE — PROGRESS NOTES
[facility]  NEW PATIENT EVALUATION    Consultation as a request of Dr. Watters      HISTORY OF PRESENT ILLNESS:     Chief Complaint   Patient presents with    Follow - Up     LOV 5/7/24. Patient f/u on low back. Pain 1/10. Patient states she is feeling 90% better. Patient is in PT with good relief. No pain meds. Denies N/T. Denies weakness.       The patient is a 83 year old female with significant past medical history of *** who presents with ***.     PHYSICAL EXAM:   Ht 65\"   Wt 131 lb (59.4 kg)   BMI 21.80 kg/m²       WRIST/HAND:  Inspection: no erythema, swelling, or obvious deformity  Palpation: no ttp in carpal bones or areas of pain  ROM: intact to all planes of motion  Strength: ***  Sensation: Intact to light touch in all dermatomes of the lower extremities  Reflexes: ***/4 at C5, C6, C7  Phalens Test: negative for reproducible symptoms  Reverse Phalens: negative for reproducible symptoms  Tinel's test: negative for reproducible symptoms    IMAGING:     ***    All imaging results were reviewed and discussed with patient.      ASSESSMENT/PLAN:     1. Lumbar facet arthropathy    2. Human immunodeficiency virus (HIV) disease ([facility])    3. NPH (normal pressure hydrocephalus) ([facility])    4. Compression fracture of L1 vertebra, sequela    5. Age-related osteoporosis without current pathological fracture    6. Anxiety    7. Gastroesophageal reflux disease without esophagitis    8. CKD stage 3a, GFR 45-59 ml/min ([facility])        Kenya Malave ***      The patient verbalized understanding with the plan and was in agreement. All questions/concerns were addressed and there were no barriers to learning.  Please note Dragon dictation software was used to dictate this note and may result in inadvertent typos.    Arnaud Mcdermott DO, FAAPMR & CAQSM  Physical Medicine and Rehabilitation  Sports and Spine Medicine    PAST MEDICAL HISTORY:     Past Medical History:    HIV (human  immunodeficiency virus infection) ([facility])    Osteopenia         PAST SURGICAL HISTORY:     Past Surgical History:   Procedure Laterality Date    Back surgery      32 years ago, and 5 years ago          Implant cochlear device           CURRENT MEDICATIONS:     Current Outpatient Medications   Medication Sig Dispense Refill    alendronate 70 MG Oral Tab Take 1 tablet (70 mg total) by mouth once a week. 12 tablet 3    methylPREDNISolone (MEDROL) 4 MG Oral Tablet Therapy Pack Dosepack: take as directed 1 each 0    nitroglycerin 0.4 MG Sublingual SL Tab Place 1 tablet (0.4 mg total) under the tongue every 5 (five) minutes as needed for Chest pain.      losartan 100 MG Oral Tab       Bictegravir-Emtricitab-Tenofov (BIKTARVY) -25 MG Oral Tab Take 1 tablet by mouth daily.      buPROPion  MG Oral Tablet 24 Hr bupropion HCl  mg 24 hr tablet, extended release   1 tab PO daily      omeprazole 20 MG Oral Capsule Delayed Release Take 1 capsule (20 mg total) by mouth daily as needed.      levothyroxine 150 MCG Oral Tab levothyroxine 150 mcg tablet   1 tab PO daily      pravastatin 20 MG Oral Tab pravastatin 20 mg tablet   1 tab PO daily           ALLERGIES:     Allergies   Allergen Reactions    Penicillins HIVES and RASH    Chloramphenicol RASH     Unknown cleanser that was used prior to a surgery caused severe skin reaction    Mirabegron NAUSEA ONLY         FAMILY HISTORY:     Family History   Problem Relation Age of Onset    Breast Cancer Mother 70          SOCIAL HISTORY:     Social History     Socioeconomic History    Marital status: Single   Tobacco Use    Smoking status: Former     Current packs/day: 0.00     Types: Cigarettes     Quit date:      Years since quittin.4    Smokeless tobacco: Never   Vaping Use    Vaping status: Never Used   Substance and Sexual Activity    Alcohol use: Not Currently     Comment: 1-2 drinks a month    Drug use: Never          REVIEW OF SYSTEMS:    Patient-reported ROS  Constitutional      Cardiovascular      Respiratory      Gastrointestinal      Hematology      Genitourinary      Musculoskeletal      Peripheral Vascular      Skin      Neurological      Psychiatric          PHYSICAL EXAM:   General: No immediate distress  Head: Normocephalic/ Atraumatic  Eyes: Extra-occular movements intact.   Ears: No auricular hematoma or deformities  Mouth: No lesions or ulcerations  Heart: peripheral pulses intact. Normal capillary refill.   Lungs: Non-labored respirations  Abdomen: No abdominal guarding  Extremities: No lower extremity edema bilaterally   Skin: No lesions noted   Cognition: alert & oriented x 3, attentive, able to follow 2 step commands, comprehention intact, spontaneous speech intact  Psychiatric: Mood and affect appropriate      LABS:     Lab Results   Component Value Date     04/10/2024    A1C 5.9 (H) 04/10/2024     Lab Results   Component Value Date    WBC 6.0 04/10/2024    RBC 3.68 (L) 04/10/2024    HGB 11.8 (L) 04/10/2024    HCT 35.0 04/10/2024    MCV 95.1 04/10/2024    MCH 32.1 04/10/2024    MCHC 33.7 04/10/2024    RDW 13.2 04/10/2024    .0 04/10/2024     Lab Results   Component Value Date    GLU 90 04/10/2024    BUN 24 (H) 04/10/2024    BUNCREA 22.4 (H) 04/10/2024    CREATSERUM 1.07 (H) 04/10/2024    ANIONGAP 2 04/10/2024    CA 10.3 04/10/2024    OSMOCALC 290 04/10/2024     04/10/2024    K 4.5 04/10/2024     04/10/2024    CO2 30.0 04/10/2024     Lab Results   Component Value Date    PTP 13.3 06/01/2023    INR 1.02 06/01/2023     Lab Results   Component Value Date    VITD 30.0 01/23/2024

## 2024-06-15 ENCOUNTER — PATIENT MESSAGE (OUTPATIENT)
Facility: CLINIC | Age: 84
End: 2024-06-15

## 2024-06-16 NOTE — TELEPHONE ENCOUNTER
From: Kenya Malave  To: Rayna Coates  Sent: 6/15/2024 4:19 PM CDT  Subject: upcoming MRI    Dear Dr. Coates,    On June 19th I am scheduled to have a brain MRI. I would like the results compared to one I had done a few years ago. Please tell me how to do this or can you take care of it?    Thank you,  Kenya

## 2024-06-19 ENCOUNTER — HOSPITAL ENCOUNTER (OUTPATIENT)
Dept: MRI IMAGING | Facility: HOSPITAL | Age: 84
Discharge: HOME | End: 2024-06-19
Attending: FAMILY MEDICINE

## 2024-06-19 DIAGNOSIS — R41.3 MEMORY CHANGE: ICD-10-CM

## 2024-06-19 DIAGNOSIS — G91.2 NPH (NORMAL PRESSURE HYDROCEPHALUS) (HCC): ICD-10-CM

## 2024-06-19 DIAGNOSIS — R29.6 FALLS FREQUENTLY: ICD-10-CM

## 2024-06-19 PROCEDURE — 70551 MRI BRAIN STEM W/O DYE: CPT | Performed by: FAMILY MEDICINE

## 2024-06-21 NOTE — TELEPHONE ENCOUNTER
From  Yumiko Claire RN To  Kenya Malave Sent and Delivered  6/21/2024  9:46 AM   Last Read in MyChart  6/21/2024 10:45 AM by Kenya Malave

## 2024-07-06 ENCOUNTER — HOSPITAL ENCOUNTER (OUTPATIENT)
Age: 84
Discharge: LEFT BEFORE DISCHARGE | End: 2024-07-06
Payer: MEDICARE

## 2024-07-06 VITALS
HEART RATE: 84 BPM | HEIGHT: 65 IN | SYSTOLIC BLOOD PRESSURE: 98 MMHG | TEMPERATURE: 98 F | DIASTOLIC BLOOD PRESSURE: 79 MMHG | RESPIRATION RATE: 18 BRPM | WEIGHT: 130 LBS | BODY MASS INDEX: 21.66 KG/M2 | OXYGEN SATURATION: 98 %

## 2024-07-25 RX ORDER — LEVOTHYROXINE SODIUM 0.15 MG/1
150 TABLET ORAL
Qty: 90 TABLET | Refills: 0 | Status: CANCELLED | OUTPATIENT
Start: 2024-07-25

## 2024-07-29 ENCOUNTER — TELEPHONE (OUTPATIENT)
Facility: CLINIC | Age: 84
End: 2024-07-29

## 2024-07-29 DIAGNOSIS — E03.9 HYPOTHYROIDISM, UNSPECIFIED TYPE: Primary | ICD-10-CM

## 2024-07-29 RX ORDER — LEVOTHYROXINE SODIUM 0.15 MG/1
150 TABLET ORAL
Qty: 90 TABLET | Refills: 3 | Status: SHIPPED | OUTPATIENT
Start: 2024-07-29

## 2024-07-30 RX ORDER — BUPROPION HYDROCHLORIDE 300 MG/1
300 TABLET ORAL DAILY
Qty: 90 TABLET | Refills: 3 | Status: SHIPPED | OUTPATIENT
Start: 2024-07-30

## 2024-07-30 RX ORDER — LOSARTAN POTASSIUM 100 MG/1
100 TABLET ORAL DAILY
Qty: 90 TABLET | Refills: 3 | Status: SHIPPED | OUTPATIENT
Start: 2024-07-30

## 2024-07-30 RX ORDER — PRAVASTATIN SODIUM 20 MG
20 TABLET ORAL NIGHTLY
Qty: 90 TABLET | Refills: 3 | Status: SHIPPED | OUTPATIENT
Start: 2024-07-30

## 2024-07-30 NOTE — TELEPHONE ENCOUNTER
Please review. Protocol Failed; No Protocol/ Pass    Medication is listed as patient reported.     Please advise if refills are appropriate.    Requested Prescriptions   Pending Prescriptions Disp Refills    losartan 100 MG Oral Tab 90 tablet 0     Sig: Take 1 tablet (100 mg total) by mouth daily.       Hypertension Medications Protocol Passed - 7/30/2024  7:52 AM        Passed - CMP or BMP in past 12 months        Passed - Last BP reading less than 140/90     BP Readings from Last 1 Encounters:   07/06/24 98/79               Passed - In person appointment or virtual visit in the past 12 mos or appointment in next 3 mos     Recent Outpatient Visits              [facility]    [facility]    2 months ago Encounter for annual health examination    [facility] Rayna Li MD    Office Visit    2 months ago [facility] facet [facility]     [facility]    6 months ago Osteopenia of neck of femur, unspecified laterality    [facility] Rayna Li MD    Office Visit                      Passed - EGFRCR or GFRNAA > 50     GFR Evaluation  EGFRCR: 52 , resulted on 4/10/2024            buPROPion  MG Oral Tablet 24 Hr 90 tablet 0     Sig: Take 1 tablet (300 mg total) by mouth daily.       Psychiatric Non-Scheduled (Anti-Anxiety) Passed - 7/30/2024  7:52 AM        Passed - In person appointment or virtual visit in the past 6 mos or appointment in next 3 mos     Recent Outpatient Visits              1 month ago [facility]     [facility] Visit    2 months ago Encounter for annual health examination    [facility] Rayna Li MD    Office Visit     2 months ago [facility]    [facility]    6 months ago Osteopenia of neck of femur, unspecified laterality    [facility] Rayna Li MD    Office Visit                      Passed - Depression Screening completed within the past 12 months          pravastatin 20 MG Oral Tab 90 tablet 0     Sig: Take 1 tablet (20 mg total) by mouth nightly.       Cholesterol Medication Protocol Failed - 7/30/2024  7:52 AM        Failed - ALT < 80     No results found for: \"ALT\"          Failed - ALT resulted within past year        Passed - Lipid panel within past 12 months     Lab Results   Component Value Date    CHOLEST 194 04/10/2024    TRIG 63 04/10/2024    HDL 73 (H) 04/10/2024     (H) 04/10/2024    VLDL 11 04/10/2024    NONHDLC 121 04/10/2024             Passed - In person appointment or virtual visit in the past 12 mos or appointment in next 3 mos     Recent Outpatient Visits              1 month ago [facility]bar facet [facility]     [facility] Visit    2 months ago Encounter for annual health examination    [facility] Rayna Li MD    Office Visit    2 months ago [facility]bar facet [facility]     [facility] Visit    6 months ago Osteopenia of neck of femur, unspecified laterality    [facility] Rayna Li MD    Office Visit                               Recent Outpatient Visits              1 month ago [facility]bar facet [facility]     [facility] Visit    2 months ago Encounter for annual health examination    [facility] Rayna Li MD    Office Visit    2 months  ago Lumbar facet arthropathy    [facility] [place] Arnaud Mcdermott, DO    Office Visit    6 months ago Osteopenia of neck of femur, unspecified laterality    [facility] Rayna Coates MD    Office Visit

## 2024-10-17 ENCOUNTER — OFFICE VISIT (OUTPATIENT)
Facility: CLINIC | Age: 84
End: 2024-10-17
Payer: MEDICARE

## 2024-10-17 VITALS
HEART RATE: 80 BPM | OXYGEN SATURATION: 99 % | BODY MASS INDEX: 22.33 KG/M2 | WEIGHT: 134 LBS | SYSTOLIC BLOOD PRESSURE: 120 MMHG | HEIGHT: 65 IN | DIASTOLIC BLOOD PRESSURE: 67 MMHG

## 2024-10-17 DIAGNOSIS — K59.03 DRUG-INDUCED CONSTIPATION: Primary | ICD-10-CM

## 2024-10-17 DIAGNOSIS — L98.9 MULTIPLE LESIONS OF FACE: ICD-10-CM

## 2024-10-17 DIAGNOSIS — Z23 NEED FOR INFLUENZA VACCINATION: ICD-10-CM

## 2024-10-17 PROCEDURE — 90662 IIV NO PRSV INCREASED AG IM: CPT | Performed by: FAMILY MEDICINE

## 2024-10-17 PROCEDURE — 99213 OFFICE O/P EST LOW 20 MIN: CPT | Performed by: FAMILY MEDICINE

## 2024-10-17 PROCEDURE — G0008 ADMIN INFLUENZA VIRUS VAC: HCPCS | Performed by: FAMILY MEDICINE

## 2024-10-17 NOTE — PROGRESS NOTES
Subjective:   Kenya Malave is a 84 year old female who presents for Follow - Up     [place] Wishes to be DNR. Will bring form for provider to sign off.     Stress incontinence and constipation  Gets botox injections. Notices will be constipated afterwards. Has been using ducolax which does help is wondering if okay to use.     Facial lesions  Is following with Dr Cobos [facility]. Was told to follow up in a few months but has new facial lesions that are changing    Would like flu shot    History/Other:    Chief Complaint Reviewed and Verified  No Further Nursing Notes to   Review  Tobacco Reviewed  Allergies Reviewed  Medications Reviewed    Problem List Reviewed  Medical History Reviewed  Surgical History   Reviewed  OB Status Reviewed  Family History Reviewed  Social History   Reviewed         Tobacco:  She smoked tobacco in the past but quit greater than 12 months ago.  Social History     Tobacco Use   Smoking Status Former    Current packs/day: 0.00    Types: Cigarettes    Quit date:     Years since quittin.8   Smokeless Tobacco Never        Current Outpatient Medications   Medication Sig Dispense Refill    losartan 100 MG Oral Tab Take 1 tablet (100 mg total) by mouth daily. 90 tablet 3    buPROPion  MG Oral Tablet 24 Hr Take 1 tablet (300 mg total) by mouth daily. 90 tablet 3    pravastatin 20 MG Oral Tab Take 1 tablet (20 mg total) by mouth nightly. 90 tablet 3    levothyroxine 150 MCG Oral Tab Take 1 tablet (150 mcg total) by mouth before breakfast. 90 tablet 3    alendronate 70 MG Oral Tab Take 1 tablet (70 mg total) by mouth once a week. 12 tablet 3    Bictegravir-Emtricitab-Tenofov (BIKTARVY) -25 MG Oral Tab Take 1 tablet by mouth daily.      omeprazole 20 MG Oral Capsule Delayed Release Take 1 capsule (20 mg total) by mouth daily as needed.      methylPREDNISolone (MEDROL) 4 MG Oral Tablet Therapy Pack Dosepack: take as directed  (Patient not taking: Reported on 10/17/2024) 1 each 0    nitroglycerin 0.4 MG Sublingual SL Tab Place 1 tablet (0.4 mg total) under the tongue every 5 (five) minutes as needed for Chest pain. (Patient not taking: Reported on 10/17/2024)           Review of Systems:  Review of Systems   Constitutional: Negative.    Respiratory: Negative.     Cardiovascular: Negative.    Gastrointestinal:  Positive for constipation.   Skin:  Positive for rash (facial lesions).   Neurological: Negative.          Objective:   /67   Pulse 80   Ht 5' 5\" (1.651 m)   Wt 134 lb (60.8 kg)   SpO2 99%   BMI 22.30 kg/m²  Estimated body mass index is 22.3 kg/m² as calculated from the following:    Height as of this encounter: 5' 5\" (1.651 m).    Weight as of this encounter: 134 lb (60.8 kg).  Physical Exam  Vitals and nursing note reviewed.   Constitutional:       General: She is not in acute distress.     Appearance: Normal appearance.   HENT:      Head: Normocephalic and atraumatic.   Eyes:      General:         Right eye: No discharge.         Left eye: No discharge.      Comments: Extraocular eye movements grossly intact   Pulmonary:      Effort: Pulmonary effort is normal.   Musculoskeletal:      Comments: Normal gait   Skin:     Comments: +erythematous patch on nose    +stuck on lesion to right side of face/cheek   Neurological:      General: No focal deficit present.      Mental Status: She is alert. Mental status is at baseline.   Psychiatric:         Mood and Affect: Mood normal.         Behavior: Behavior normal.           Assessment & Plan:   1. Drug-induced constipation (Primary)  -counseled patient okay to take ducolax for maintenance    2. Multiple lesions of face  -given reoccurrence of lesion on nose and change of lesion on face recommended patient see if can follow up sooner with dermatology    3. Need for influenza vaccination  -     High Dose Fluzone trivalent influenza, 65 yrs+ PFS [00468]    -administered by  staff    Return in about 1 year (around 10/17/2025), or if symptoms worsen or fail to improve.    Rayna Coates MD, 10/17/2024, 1:43 PM

## 2024-11-12 RX ORDER — BICTEGRAVIR SODIUM, EMTRICITABINE, AND TENOFOVIR ALAFENAMIDE FUMARATE 50; 200; 25 MG/1; MG/1; MG/1
1 TABLET ORAL DAILY
Qty: 90 TABLET | Refills: 3 | Status: SHIPPED | OUTPATIENT
Start: 2024-11-12

## 2024-11-12 NOTE — TELEPHONE ENCOUNTER
Dr. Coates - you have not yet prescribed Biktarvy for patient, please advise    Call received from [facility] phone #[phone], spoke with Marina, bhupinder tech.     They follow with patient on her Biktarvy prescription and patient stated she is almost out and just established care with [facility].     Confirmed correct dosage of Biktarvy is -25 mg, electronic prescription is needed.    Added this pharmacy to patient's list, this medication is not available at her local [facility].    Per chart review, Biktarvy is discussed in office visit notes with Dr. Coates on 5/10/24.

## 2024-11-22 ENCOUNTER — TELEPHONE (OUTPATIENT)
Facility: CLINIC | Age: 84
End: 2024-11-22

## 2024-11-22 NOTE — TELEPHONE ENCOUNTER
Received a request for preoperative clearance from urology. Part of request is H&P. Patient will need an appointment. I do not have availability prior to surgery so will have to be double booked.     Please offer 11/25/2024 times of:    9:00 a.m.--> recommended because this will allow for blood work and EKG.   3:30 p.m  4:30 p.m.  These two appointments will not allow for blood work and EKG on same day patient will need to come back for blood work    Rayna Coates MD, 11/22/24, 12:17 PM

## 2024-11-25 ENCOUNTER — EKG ENCOUNTER (OUTPATIENT)
Dept: LAB | Age: 84
End: 2024-11-25
Attending: FAMILY MEDICINE
Payer: MEDICARE

## 2024-11-25 ENCOUNTER — LAB ENCOUNTER (OUTPATIENT)
Dept: LAB | Facility: REFERENCE LAB | Age: 84
End: 2024-11-25
Attending: FAMILY MEDICINE
Payer: MEDICARE

## 2024-11-25 ENCOUNTER — OFFICE VISIT (OUTPATIENT)
Facility: CLINIC | Age: 84
End: 2024-11-25
Payer: MEDICARE

## 2024-11-25 VITALS
BODY MASS INDEX: 21.99 KG/M2 | SYSTOLIC BLOOD PRESSURE: 110 MMHG | WEIGHT: 132 LBS | HEART RATE: 77 BPM | DIASTOLIC BLOOD PRESSURE: 66 MMHG | HEIGHT: 65 IN | OXYGEN SATURATION: 97 %

## 2024-11-25 DIAGNOSIS — N39.3 SUI (STRESS URINARY INCONTINENCE, FEMALE): ICD-10-CM

## 2024-11-25 DIAGNOSIS — Z01.818 PREOPERATIVE CLEARANCE: ICD-10-CM

## 2024-11-25 DIAGNOSIS — N39.3 SUI (STRESS URINARY INCONTINENCE, FEMALE): Primary | ICD-10-CM

## 2024-11-25 LAB
ANION GAP SERPL CALC-SCNC: 7 MMOL/L (ref 0–18)
ATRIAL RATE: 71 BPM
BASOPHILS # BLD AUTO: 0.03 X10(3) UL (ref 0–0.2)
BASOPHILS NFR BLD AUTO: 0.5 %
BUN BLD-MCNC: 29 MG/DL (ref 9–23)
BUN/CREAT SERPL: 28.4 (ref 10–20)
CALCIUM BLD-MCNC: 9.8 MG/DL (ref 8.7–10.4)
CHLORIDE SERPL-SCNC: 108 MMOL/L (ref 98–112)
CO2 SERPL-SCNC: 30 MMOL/L (ref 21–32)
CREAT BLD-MCNC: 1.02 MG/DL
DEPRECATED RDW RBC AUTO: 49.3 FL (ref 35.1–46.3)
EGFRCR SERPLBLD CKD-EPI 2021: 54 ML/MIN/1.73M2 (ref 60–?)
EOSINOPHIL # BLD AUTO: 0.11 X10(3) UL (ref 0–0.7)
EOSINOPHIL NFR BLD AUTO: 1.8 %
ERYTHROCYTE [DISTWIDTH] IN BLOOD BY AUTOMATED COUNT: 13.8 % (ref 11–15)
FASTING STATUS PATIENT QL REPORTED: NO
GLUCOSE BLD-MCNC: 72 MG/DL (ref 70–99)
HCT VFR BLD AUTO: 34.3 %
HGB BLD-MCNC: 11 G/DL
IMM GRANULOCYTES # BLD AUTO: 0.01 X10(3) UL (ref 0–1)
IMM GRANULOCYTES NFR BLD: 0.2 %
LYMPHOCYTES # BLD AUTO: 2.84 X10(3) UL (ref 1–4)
LYMPHOCYTES NFR BLD AUTO: 45.8 %
MCH RBC QN AUTO: 30.8 PG (ref 26–34)
MCHC RBC AUTO-ENTMCNC: 32.1 G/DL (ref 31–37)
MCV RBC AUTO: 96.1 FL
MONOCYTES # BLD AUTO: 0.68 X10(3) UL (ref 0.1–1)
MONOCYTES NFR BLD AUTO: 11 %
NEUTROPHILS # BLD AUTO: 2.53 X10 (3) UL (ref 1.5–7.7)
NEUTROPHILS # BLD AUTO: 2.53 X10(3) UL (ref 1.5–7.7)
NEUTROPHILS NFR BLD AUTO: 40.7 %
OSMOLALITY SERPL CALC.SUM OF ELEC: 304 MOSM/KG (ref 275–295)
P AXIS: 37 DEGREES
P-R INTERVAL: 146 MS
PLATELET # BLD AUTO: 342 10(3)UL (ref 150–450)
POTASSIUM SERPL-SCNC: 4.1 MMOL/L (ref 3.5–5.1)
Q-T INTERVAL: 410 MS
QRS DURATION: 96 MS
QTC CALCULATION (BEZET): 445 MS
R AXIS: -44 DEGREES
RBC # BLD AUTO: 3.57 X10(6)UL
SODIUM SERPL-SCNC: 145 MMOL/L (ref 136–145)
T AXIS: 3 DEGREES
VENTRICULAR RATE: 71 BPM
WBC # BLD AUTO: 6.2 X10(3) UL (ref 4–11)

## 2024-11-25 PROCEDURE — 85025 COMPLETE CBC W/AUTO DIFF WBC: CPT

## 2024-11-25 PROCEDURE — 93005 ELECTROCARDIOGRAM TRACING: CPT

## 2024-11-25 PROCEDURE — 80048 BASIC METABOLIC PNL TOTAL CA: CPT

## 2024-11-25 PROCEDURE — 36415 COLL VENOUS BLD VENIPUNCTURE: CPT

## 2024-11-25 PROCEDURE — 87086 URINE CULTURE/COLONY COUNT: CPT

## 2024-11-25 PROCEDURE — 93010 ELECTROCARDIOGRAM REPORT: CPT | Performed by: INTERNAL MEDICINE

## 2024-11-25 NOTE — PROGRESS NOTES
Subjective:   Kenya Malave is a 84 year old female who presents for Other (Pre op clearance )     Patient presents for pre procedure clearance. Will be getting botox bladder injections on 2024. Patient did have echo stress test done in  for chest pain which was normal with cardiology. Does have nitroglycerine prescribed by cardiologist but patient has never taken and never needed.   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 Patient can walk 30+ miles a week without any chest pain or shortness of breath.       History/Other:    Chief Complaint Reviewed and Verified  Nursing Notes Reviewed and   Verified  Tobacco Reviewed  Allergies Reviewed  Medications Reviewed    Problem List Reviewed  Medical History Reviewed  Surgical History   Reviewed  Family History Reviewed         Tobacco:  She smoked tobacco in the past but quit greater than 12 months ago.  Social History     Tobacco Use   Smoking Status Former    Current packs/day: 0.00    Types: Cigarettes    Quit date:     Years since quittin.9   Smokeless Tobacco Never        Current Outpatient Medications   Medication Sig Dispense Refill    bictegravir-emtricitabine-tenofovir alafenamide (BIKTARVY) -25 MG Oral Tab Take 1 tablet by mouth daily. 90 tablet 3    losartan 100 MG Oral Tab Take 1 tablet (100 mg total) by mouth daily. 90 tablet 3    pravastatin 20 MG Oral Tab Take 1 tablet (20 mg total) by mouth nightly. 90 tablet 3    levothyroxine 150 MCG Oral Tab Take 1 tablet (150 mcg total) by mouth before breakfast. 90 tablet 3    alendronate 70 MG Oral Tab Take 1 tablet (70 mg total) by mouth once a week. 12 tablet 3    nitroglycerin 0.4 MG Sublingual SL Tab Place 1 tablet (0.4 mg total) under the tongue every 5 (five) minutes as needed for Chest pain.      omeprazole 20 MG Oral Capsule Delayed Release Take 1 capsule (20 mg total) by mouth daily as needed.           Review of Systems:  Review of Systems    Constitutional: Negative.    HENT: Negative.     Eyes: Negative.    Respiratory: Negative.     Cardiovascular: Negative.    Gastrointestinal: Negative.    Genitourinary: Negative.    Musculoskeletal: Negative.    Skin: Negative.    Neurological: Negative.    Psychiatric/Behavioral: Negative.           Objective:   /66   Pulse 77   Ht 5' 5\" (1.651 m)   Wt 132 lb (59.9 kg)   SpO2 97%   BMI 21.97 kg/m²  Estimated body mass index is 21.97 kg/m² as calculated from the following:    Height as of this encounter: 5' 5\" (1.651 m).    Weight as of this encounter: 132 lb (59.9 kg).        Physical Exam  Vitals and nursing note reviewed.   Constitutional:       General: She is not in acute distress.     Appearance: Normal appearance. She is not ill-appearing.   HENT:      Head: Normocephalic and atraumatic.      Right Ear: Tympanic membrane normal. There is no impacted cerumen.      Left Ear: Tympanic membrane normal. There is no impacted cerumen.      Mouth/Throat:      Mouth: Mucous membranes are moist.      Pharynx: Oropharynx is clear. No oropharyngeal exudate or posterior oropharyngeal erythema.   Eyes:      General:         Right eye: No discharge.         Left eye: No discharge.      Extraocular Movements: Extraocular movements intact.      Pupils: Pupils are equal, round, and reactive to light.      Comments: Extraocular eye movements grossly intact   Cardiovascular:      Rate and Rhythm: Normal rate and regular rhythm.      Heart sounds: Normal heart sounds. No murmur heard.     No friction rub. No gallop.   Pulmonary:      Effort: Pulmonary effort is normal.      Breath sounds: Normal breath sounds. No wheezing, rhonchi or rales.   Abdominal:      General: Abdomen is flat. Bowel sounds are normal. There is no distension.      Palpations: Abdomen is soft. There is no mass.      Tenderness: There is no abdominal tenderness. There is no guarding or rebound.   Musculoskeletal:         General: Normal range of  motion.      Cervical back: Normal range of motion.      Right lower leg: No edema.      Left lower leg: No edema.      Comments: Normal gait   Skin:     General: Skin is warm and dry.      Findings: No rash.   Neurological:      General: No focal deficit present.      Mental Status: She is alert. Mental status is at baseline.   Psychiatric:         Mood and Affect: Mood normal.         Behavior: Behavior normal.         Assessment & Plan:   1. KATHI (stress urinary incontinence, female) (Primary)  2. Preoperative clearance  Orders:  -     CBC With Differential With Platelet; Future; Expected date: 11/25/2024  -     Basic Metabolic Panel (8); Future; Expected date: 11/25/2024  -     EKG 12 Lead; Future; Expected date: 11/25/2024  -     Urine Culture, Routine; Future; Expected date: 11/25/2024      -patient otherwise low risk with score of 0 on revised cardiac index presurgical risk score which correlates to 3.9% risk of major cardiac event after surgery. Patient is clear to proceed with surgery with surgeon acknowledgement of risks as stated.       Return if symptoms worsen or fail to improve.    Rayna Coates MD, 11/25/2024, 8:59 AM

## 2024-12-02 ENCOUNTER — PATIENT MESSAGE (OUTPATIENT)
Facility: CLINIC | Age: 84
End: 2024-12-02

## 2024-12-04 NOTE — TELEPHONE ENCOUNTER
From  Analia Flores RN To  Kenya Malave Sent and Delivered  12/3/2024  4:03 PM   Last Read in MyChart  12/4/2024  6:12 AM by Kenya Malave

## 2024-12-16 ENCOUNTER — TELEPHONE (OUTPATIENT)
Facility: CLINIC | Age: 84
End: 2024-12-16

## 2024-12-16 DIAGNOSIS — H90.3 SENSORY HEARING LOSS, BILATERAL: Primary | ICD-10-CM

## 2024-12-16 NOTE — TELEPHONE ENCOUNTER
Lindsay from [facility] department left voicemail on the [facility] main phone number.   [facility] received the referral on patient.There was some information that needs to be corrected on the referral before can get the patient scheduled. If you could please return Yadira call at [phone].     See 12/2/2024 patient message

## 2024-12-17 NOTE — TELEPHONE ENCOUNTER
Spoke with Lindsay at [facility], Date of Birth verified  She requested a copy of patient demographic information, also need a new referral to Dept of Audiology at [facility].   We referred patient to Dr Samira Hammonds ENT, but patient need to see Audiology not ENT.   New Audiology referral pended.   Fax [phone]  [facility] [phone]

## 2024-12-19 NOTE — TELEPHONE ENCOUNTER
Referral faxed to [facility] through [employer] to fax [phone].  [employer] message sent to patient.

## 2024-12-19 NOTE — TELEPHONE ENCOUNTER
[employer] message read.    Last read by Kenya Malave at 10:50 PM on 12/18/2024.

## 2025-01-08 ENCOUNTER — PATIENT MESSAGE (OUTPATIENT)
Facility: CLINIC | Age: 85
End: 2025-01-08

## 2025-01-09 NOTE — TELEPHONE ENCOUNTER
Clinical staff, can you please help with the letter, please note there are 2 [employer] messages total for this request.

## 2025-01-14 ENCOUNTER — PATIENT MESSAGE (OUTPATIENT)
Facility: CLINIC | Age: 85
End: 2025-01-14

## 2025-01-17 NOTE — TELEPHONE ENCOUNTER
Routed to site staff for assistance, did you receive any letter / form?   Pls see patient above message, thanks.      No future appointments.

## 2025-01-20 ENCOUNTER — PATIENT MESSAGE (OUTPATIENT)
Facility: CLINIC | Age: 85
End: 2025-01-20

## 2025-01-22 NOTE — TELEPHONE ENCOUNTER
Routed to [facility] to run for protocol , thanks.   Routed to site staff for assistance, pls follow up on letter that was dropped off by patient,  thanks.        Requested Prescriptions     Pending Prescriptions Disp Refills    omeprazole 20 MG Oral Capsule Delayed Release 90 capsule 3     Sig: Take 1 capsule (20 mg total) by mouth daily as needed.       Last Office Visit with PCP: 11/25/2024

## 2025-01-23 NOTE — TELEPHONE ENCOUNTER
Dr. Coates, this medication is patient reported. No record of you ordering. Please advise on pended medication.    Requested Prescriptions     Pending Prescriptions Disp Refills    omeprazole 20 MG Oral Capsule Delayed Release 90 capsule 3     Sig: Take 1 capsule (20 mg total) by mouth daily as needed.

## 2025-01-24 ENCOUNTER — TELEPHONE (OUTPATIENT)
Facility: CLINIC | Age: 85
End: 2025-01-24

## 2025-01-24 NOTE — TELEPHONE ENCOUNTER
Letter patient dropped off is a letter written by patient to explain situation regarding [facility]. I have repeatedly asked for a letter to be pended excusing patient for membership dues which is all the patient needs.     Please pend letter stating patient should be refunded membership dues November through January per my recommendation. This is all the patient needs. No need to further contact her.     Pend letter    Rayna Coates MD, 01/24/25, 12:44 PM

## 2025-01-30 ENCOUNTER — TELEPHONE (OUTPATIENT)
Facility: CLINIC | Age: 85
End: 2025-01-30

## 2025-04-23 ENCOUNTER — LAB ENCOUNTER (OUTPATIENT)
Dept: LAB | Facility: REFERENCE LAB | Age: 85
End: 2025-04-23
Attending: FAMILY MEDICINE
Payer: MEDICARE

## 2025-04-23 DIAGNOSIS — G31.84 MILD COGNITIVE IMPAIRMENT: ICD-10-CM

## 2025-04-23 DIAGNOSIS — R41.3 MEMORY CHANGE: ICD-10-CM

## 2025-04-23 DIAGNOSIS — R29.6 FALLS FREQUENTLY: ICD-10-CM

## 2025-04-23 LAB
ALBUMIN SERPL-MCNC: 4.6 G/DL (ref 3.2–4.8)
ALBUMIN/GLOB SERPL: 1.9 {RATIO} (ref 1–2)
ALP LIVER SERPL-CCNC: 52 U/L (ref 55–142)
ALT SERPL-CCNC: 8 U/L (ref 10–49)
ANION GAP SERPL CALC-SCNC: 8 MMOL/L (ref 0–18)
AST SERPL-CCNC: 20 U/L (ref ?–34)
BILIRUB SERPL-MCNC: 0.4 MG/DL (ref 0.2–1.1)
BILIRUB UR QL: NEGATIVE
BUN BLD-MCNC: 30 MG/DL (ref 9–23)
BUN/CREAT SERPL: 28.6 (ref 10–20)
CALCIUM BLD-MCNC: 9.8 MG/DL (ref 8.7–10.4)
CHLORIDE SERPL-SCNC: 105 MMOL/L (ref 98–112)
CLARITY UR: CLEAR
CO2 SERPL-SCNC: 28 MMOL/L (ref 21–32)
CREAT BLD-MCNC: 1.05 MG/DL (ref 0.55–1.02)
EGFRCR SERPLBLD CKD-EPI 2021: 52 ML/MIN/1.73M2 (ref 60–?)
FASTING STATUS PATIENT QL REPORTED: NO
GLOBULIN PLAS-MCNC: 2.4 G/DL (ref 2–3.5)
GLUCOSE BLD-MCNC: 92 MG/DL (ref 70–99)
GLUCOSE UR-MCNC: NORMAL MG/DL
HGB UR QL STRIP.AUTO: NEGATIVE
KETONES UR-MCNC: NEGATIVE MG/DL
LEUKOCYTE ESTERASE UR QL STRIP.AUTO: 75
NITRITE UR QL STRIP.AUTO: NEGATIVE
OSMOLALITY SERPL CALC.SUM OF ELEC: 298 MOSM/KG (ref 275–295)
PH UR: 5 [PH] (ref 5–8)
POTASSIUM SERPL-SCNC: 4.4 MMOL/L (ref 3.5–5.1)
PROT SERPL-MCNC: 7 G/DL (ref 5.7–8.2)
PROT UR-MCNC: NEGATIVE MG/DL
SODIUM SERPL-SCNC: 141 MMOL/L (ref 136–145)
SP GR UR STRIP: 1.02 (ref 1–1.03)
TSI SER-ACNC: 1.16 UIU/ML (ref 0.55–4.78)
UROBILINOGEN UR STRIP-ACNC: NORMAL
VIT B12 SERPL-MCNC: 1969 PG/ML (ref 211–911)

## 2025-04-23 PROCEDURE — 87086 URINE CULTURE/COLONY COUNT: CPT

## 2025-04-23 PROCEDURE — 84443 ASSAY THYROID STIM HORMONE: CPT

## 2025-04-23 PROCEDURE — 82607 VITAMIN B-12: CPT

## 2025-04-23 PROCEDURE — 80053 COMPREHEN METABOLIC PANEL: CPT

## 2025-04-23 PROCEDURE — 81001 URINALYSIS AUTO W/SCOPE: CPT

## 2025-04-23 PROCEDURE — 36415 COLL VENOUS BLD VENIPUNCTURE: CPT

## 2025-04-24 ENCOUNTER — TELEPHONE (OUTPATIENT)
Facility: CLINIC | Age: 85
End: 2025-04-24

## 2025-05-08 ENCOUNTER — OFFICE VISIT (OUTPATIENT)
Facility: CLINIC | Age: 85
End: 2025-05-08
Payer: MEDICARE

## 2025-05-08 ENCOUNTER — LAB ENCOUNTER (OUTPATIENT)
Dept: LAB | Age: 85
End: 2025-05-08
Attending: FAMILY MEDICINE
Payer: MEDICARE

## 2025-05-08 VITALS
BODY MASS INDEX: 23.66 KG/M2 | HEART RATE: 60 BPM | OXYGEN SATURATION: 98 % | SYSTOLIC BLOOD PRESSURE: 124 MMHG | DIASTOLIC BLOOD PRESSURE: 68 MMHG | HEIGHT: 65 IN | WEIGHT: 142 LBS

## 2025-05-08 DIAGNOSIS — D64.9 ANEMIA, UNSPECIFIED TYPE: ICD-10-CM

## 2025-05-08 DIAGNOSIS — R29.6 FALLS FREQUENTLY: ICD-10-CM

## 2025-05-08 DIAGNOSIS — R41.3 MEMORY CHANGE: ICD-10-CM

## 2025-05-08 DIAGNOSIS — R53.83 OTHER FATIGUE: Primary | ICD-10-CM

## 2025-05-08 DIAGNOSIS — G47.10 HYPERSOMNIA: ICD-10-CM

## 2025-05-08 DIAGNOSIS — Z01.818 PREOPERATIVE CLEARANCE: ICD-10-CM

## 2025-05-08 DIAGNOSIS — R53.83 OTHER FATIGUE: ICD-10-CM

## 2025-05-08 DIAGNOSIS — G31.84 MILD COGNITIVE IMPAIRMENT: ICD-10-CM

## 2025-05-08 DIAGNOSIS — M65.331 TRIGGER MIDDLE FINGER OF RIGHT HAND: ICD-10-CM

## 2025-05-08 LAB
ATRIAL RATE: 87 BPM
BASOPHILS # BLD AUTO: 0.02 X10(3) UL (ref 0–0.2)
BASOPHILS NFR BLD AUTO: 0.3 %
DEPRECATED HBV CORE AB SER IA-ACNC: 40 NG/ML (ref 50–306)
DEPRECATED RDW RBC AUTO: 49.3 FL (ref 35.1–46.3)
EOSINOPHIL # BLD AUTO: 0.12 X10(3) UL (ref 0–0.7)
EOSINOPHIL NFR BLD AUTO: 1.7 %
ERYTHROCYTE [DISTWIDTH] IN BLOOD BY AUTOMATED COUNT: 13.9 % (ref 11–15)
HCT VFR BLD AUTO: 38.3 % (ref 35–48)
HGB BLD-MCNC: 12.4 G/DL (ref 12–16)
IMM GRANULOCYTES # BLD AUTO: 0.01 X10(3) UL (ref 0–1)
IMM GRANULOCYTES NFR BLD: 0.1 %
IRON SATN MFR SERPL: 24 % (ref 15–50)
IRON SERPL-MCNC: 87 UG/DL (ref 50–170)
LYMPHOCYTES # BLD AUTO: 2.81 X10(3) UL (ref 1–4)
LYMPHOCYTES NFR BLD AUTO: 39.4 %
MCH RBC QN AUTO: 30.9 PG (ref 26–34)
MCHC RBC AUTO-ENTMCNC: 32.4 G/DL (ref 31–37)
MCV RBC AUTO: 95.5 FL (ref 80–100)
MONOCYTES # BLD AUTO: 0.58 X10(3) UL (ref 0.1–1)
MONOCYTES NFR BLD AUTO: 8.1 %
NEUTROPHILS # BLD AUTO: 3.59 X10 (3) UL (ref 1.5–7.7)
NEUTROPHILS # BLD AUTO: 3.59 X10(3) UL (ref 1.5–7.7)
NEUTROPHILS NFR BLD AUTO: 50.4 %
P AXIS: 42 DEGREES
P-R INTERVAL: 172 MS
PLATELET # BLD AUTO: 365 10(3)UL (ref 150–450)
Q-T INTERVAL: 400 MS
QRS DURATION: 124 MS
QTC CALCULATION (BEZET): 481 MS
R AXIS: -50 DEGREES
RBC # BLD AUTO: 4.01 X10(6)UL (ref 3.8–5.3)
T AXIS: -10 DEGREES
TOTAL IRON BINDING CAPACITY: 366 UG/DL (ref 250–425)
TRANSFERRIN SERPL-MCNC: 300 MG/DL (ref 250–380)
VENTRICULAR RATE: 87 BPM
WBC # BLD AUTO: 7.1 X10(3) UL (ref 4–11)

## 2025-05-08 PROCEDURE — 93005 ELECTROCARDIOGRAM TRACING: CPT

## 2025-05-08 PROCEDURE — 93010 ELECTROCARDIOGRAM REPORT: CPT | Performed by: STUDENT IN AN ORGANIZED HEALTH CARE EDUCATION/TRAINING PROGRAM

## 2025-05-08 PROCEDURE — 99214 OFFICE O/P EST MOD 30 MIN: CPT | Performed by: FAMILY MEDICINE

## 2025-05-08 NOTE — PROGRESS NOTES
Subjective:   Kenya Malave is a 84 year old female who presents for Pre-Op (Botox in her bladder). Will be getting Cystoscopy with botox injections on 5/15/25 under MAC sedation. Patient continues to able to walk 30+ miles a week without any chest pain or shortness of breath. Does have nitroglycerine prescribed by cardiologist but patient has never taken and never needed. Previous EKGs do have known incomplete RBBB and low QRS voltage.     Patient is also noting falls asleep very easily. Gets good sleep. If cannot fall asleep will take 3 melatonin and falls asleep for the rest of the night. Can fall asleep in waiting rooms. Does not snore. No apnea episodes that patient is aware of.     Patient does have trigger finger of right 3rd digit. Patient is wondering what can be done about this    History/Other:    Chief Complaint Reviewed and Verified  Nursing Notes Reviewed and   Verified  Tobacco Reviewed  Allergies Reviewed  Medications Reviewed    Problem List Reviewed  Medical History Reviewed  Surgical History   Reviewed  OB Status Reviewed  Family History Reviewed  Social History   Reviewed         Tobacco:  She smoked tobacco in the past but quit greater than 12 months ago.  Tobacco Use[1]     Current Medications[2]      Review of Systems:  Review of Systems   Constitutional:  Positive for fatigue.   HENT: Negative.     Eyes: Negative.    Respiratory: Negative.     Cardiovascular: Negative.    Gastrointestinal: Negative.    Genitourinary: Negative.    Musculoskeletal:         +trigger finger right 3rd digit   Skin: Negative.    Neurological: Negative.    Psychiatric/Behavioral:  Positive for sleep disturbance.          Objective:   /68   Pulse 60   Ht 5' 5\" (1.651 m)   Wt 142 lb (64.4 kg)   SpO2 98%   BMI 23.63 kg/m²  Estimated body mass index is 23.63 kg/m² as calculated from the following:    Height as of this encounter: 5' 5\" (1.651 m).    Weight as of this encounter: 142 lb (64.4  kg).  Physical Exam  Vitals and nursing note reviewed.   Constitutional:       General: She is not in acute distress.     Appearance: Normal appearance. She is not ill-appearing.   HENT:      Head: Normocephalic and atraumatic.      Right Ear: Tympanic membrane normal. There is no impacted cerumen.      Left Ear: Tympanic membrane normal. There is no impacted cerumen.      Mouth/Throat:      Mouth: Mucous membranes are moist.      Pharynx: Oropharynx is clear. No oropharyngeal exudate or posterior oropharyngeal erythema.   Eyes:      General:         Right eye: No discharge.         Left eye: No discharge.      Extraocular Movements: Extraocular movements intact.      Pupils: Pupils are equal, round, and reactive to light.   Cardiovascular:      Rate and Rhythm: Normal rate and regular rhythm.      Heart sounds: Normal heart sounds. No murmur heard.     No friction rub. No gallop.   Pulmonary:      Effort: Pulmonary effort is normal.      Breath sounds: Normal breath sounds. No wheezing, rhonchi or rales.   Abdominal:      General: Abdomen is flat. Bowel sounds are normal. There is no distension.      Palpations: Abdomen is soft. There is no mass.      Tenderness: There is no abdominal tenderness. There is no guarding or rebound.   Musculoskeletal:         General: Normal range of motion.      Cervical back: Normal range of motion.      Right lower leg: No edema.      Left lower leg: No edema.      Comments: No nodules palpated in either palm   Skin:     General: Skin is warm and dry.      Findings: No rash.   Neurological:      General: No focal deficit present.      Mental Status: She is alert. Mental status is at baseline.   Psychiatric:         Mood and Affect: Mood normal.         Behavior: Behavior normal.         Assessment & Plan:   1. Other fatigue (Primary)  -     Diagnostic Sleep Study  -     General sleep study; Future; Expected date: 06/08/2025  -     Iron And Tibc; Future; Expected date: 05/08/2025  -      Ferritin; Future; Expected date: 2025    Previous labs unremarkable.  B12 normal.  Thyroid normal.  Hemoglobin was mildly low in 2024.  Patient will be getting repeat CBC today along with iron studies as ordered.  Also ordered sleep study for further evaluation    2. Hypersomnia  -     Diagnostic Sleep Study  -     General sleep study; Future; Expected date: 2025    Ordered sleep study to begin workup    3. Anemia, unspecified type  -     Iron And Tibc; Future; Expected date: 2025  -     Ferritin; Future; Expected date: 2025    -noted on labs from 2024. Patient is supplementing with iron.     4. Preoperative clearance  -     EKG 12 Lead; Future; Expected date: 2025    -patient low risk for upcoming procedure with score of 0 on revised cardiac index presurgical risk score which correlates to 3.9% risk of major cardiac event after surgery. Patient is clear to proceed with surgery with surgeon acknowledgement of risks as stated.     5. Trigger middle finger of right hand    Counseled patient on referral to orthopedic hand specialist for potential injections.  Patient states trigger finger is not that bothersome and would like to hold off for now.      Return in 26 days (on 6/3/2025) for Medicare wellness.    Rayna Coates MD, 2025, 12:53 PM     EKG obtained for preop clearance with new complete right bundle branch block. Will need cardiac clearance to proceed with planned procedure. Referred patient to cardiology for cardiac clearance.     Rayna Coates MD, 25, 4:42 PM         [1]   Social History  Tobacco Use   Smoking Status Former    Current packs/day: 0.00    Types: Cigarettes    Quit date:     Years since quittin.3   Smokeless Tobacco Never   [2]   Current Outpatient Medications   Medication Sig Dispense Refill    omeprazole 20 MG Oral Capsule Delayed Release Take 1 capsule (20 mg total) by mouth daily as needed. 90 capsule 3     bictegravir-emtricitabine-tenofovir alafenamide (BIKTARVY) -25 MG Oral Tab Take 1 tablet by mouth daily. 90 tablet 3    losartan 100 MG Oral Tab Take 1 tablet (100 mg total) by mouth daily. 90 tablet 3    pravastatin 20 MG Oral Tab Take 1 tablet (20 mg total) by mouth nightly. 90 tablet 3    levothyroxine 150 MCG Oral Tab Take 1 tablet (150 mcg total) by mouth before breakfast. 90 tablet 3    alendronate 70 MG Oral Tab Take 1 tablet (70 mg total) by mouth once a week. 12 tablet 3    nitroglycerin 0.4 MG Sublingual SL Tab Place 1 tablet (0.4 mg total) under the tongue every 5 (five) minutes as needed for Chest pain.

## 2025-05-18 ENCOUNTER — PATIENT MESSAGE (OUTPATIENT)
Facility: CLINIC | Age: 85
End: 2025-05-18

## 2025-05-18 DIAGNOSIS — G47.10 HYPERSOMNIA: ICD-10-CM

## 2025-05-18 DIAGNOSIS — R53.83 OTHER FATIGUE: Primary | ICD-10-CM

## 2025-05-28 ENCOUNTER — OFFICE VISIT (OUTPATIENT)
Dept: PHYSICAL MEDICINE AND REHAB | Facility: CLINIC | Age: 85
End: 2025-05-28
Payer: MEDICARE

## 2025-05-28 VITALS — BODY MASS INDEX: 23.66 KG/M2 | HEIGHT: 65 IN | WEIGHT: 142 LBS

## 2025-05-28 DIAGNOSIS — M47.816 LUMBAR FACET ARTHROPATHY: Primary | ICD-10-CM

## 2025-05-28 DIAGNOSIS — S32.010S COMPRESSION FRACTURE OF L1 VERTEBRA, SEQUELA: ICD-10-CM

## 2025-05-28 DIAGNOSIS — G91.2 NPH (NORMAL PRESSURE HYDROCEPHALUS) (HCC): ICD-10-CM

## 2025-05-28 DIAGNOSIS — B20 HUMAN IMMUNODEFICIENCY VIRUS (HIV) DISEASE (HCC): ICD-10-CM

## 2025-05-28 PROCEDURE — 99214 OFFICE O/P EST MOD 30 MIN: CPT | Performed by: PHYSICAL MEDICINE & REHABILITATION

## 2025-05-28 NOTE — PROGRESS NOTES
[facility]  OFFICE FOLLOW UP EVALUATION      HISTORY OF PRESENT ILLNESS:     Chief Complaint   Patient presents with    Follow - Up     LOV 6/5/24 pt is here for a follow up on back pain. No n/t. No pain meds or muscle relaxer's. No current physical therapy. Pain 0/10       History of Present Illness  Kenya Malave is an 84-year-old female with arthritis who presents with lower back pain.    She experiences lower back pain primarily in the mornings upon waking, which improves with movement. The pain is localized to the lower back without radiation to the legs. There is no numbness, tingling, or weakness. Pain sometimes recurs in the afternoon after prolonged sitting but is less severe. Walking alleviates the pain, and she can walk up to ten thousand steps without issue. She has taken ibuprofen once recently. Her medical history includes arthritis in the facet joints of the lower back. She previously underwent physical therapy but found it unhelpful.      PHYSICAL EXAM:     Ht 65\"   Wt 142 lb (64.4 kg)   BMI 23.63 kg/m²     Gait Able to toe walk and heel walk without any difficulty     LUMBAR SPINE:  Inspection: no erythema, swelling, or obvious deformity.  Their iliac crest and shoulder heights are symmetrical.     Palpation: Non tender to palpation of the spinous process.   ROM: Treated with forward flexion and extension  Strength: 5/5 in bilateral lower extremities  Sensation: Intact to light touch in all dermatomes of the lower extremities  Reflexes: 1/4 at L4 and S1  Facet Loading: Positive bilateral lower lumbar facet joints  Straight leg raise: negative for radicular pain symptoms  Slump test: negative for pain symptoms for radicular pain symptoms    IMAGING:     CT Lumbar spine completed on 4/29/2024 was personally reviewed which is notable for varying degrees of spinal and foraminal narrowing throughout the lumbar spine. There is degenerative disease  throughout the lumbar spine. There is sparing degree of bulging disks as well. There is a remote L1 vertebral body fracture with history of prior kyphoplasty.     All imaging results were reviewed and discussed with patient.      ASSESSMENT/PLAN:     1. Lumbar facet arthropathy    2. Human immunodeficiency virus (HIV) disease ([facility])    3. NPH (normal pressure hydrocephalus) ([facility])    4. Compression fracture of L1 vertebra, sequela      Assessment & Plan  Lumbar facet arthropathy  Chronic lumbar facet arthropathy causing lower back pain, improved with movement. Previous physical therapy was ineffective. Discussed NSAID and acetaminophen use, and facet joint injections as treatment options. Emphasized exercise importance.  - Encourage regular walking and movement.  - Consider acetaminophen daily, two tablets morning and afternoon as needed.  - Discuss facet joint injections if pain persists.  - Order physical therapy referral with option for different provider.  - Provide names of local physical therapy providers.      The patient verbalized understanding with the plan and was in agreement. All questions/concerns were addressed and there were no barriers to learning.  Please note Dragon dictation software was used to dictate this note and may result in inadvertent typos.    Arnaud Mcdermott DO, FAAPMR & CAQSM  Physical Medicine and Rehabilitation  Sports and Spine Medicine    PAST MEDICAL HISTORY:   Past Medical History[1]      PAST SURGICAL HISTORY:   Past Surgical History[2]      CURRENT MEDICATIONS:   Current Medications[3]      ALLERGIES:   Allergies[4]      FAMILY HISTORY:   Family History[5]       SOCIAL HISTORY:   Short Social Hx on File[6]       REVIEW OF SYSTEMS:   A comprehensive 10 point review of systems was completed.  Pertinent positives and negatives noted in the the HPI.      LABS:     Lab Results   Component Value Date     04/10/2024    A1C 5.9 (H) 04/10/2024     Lab Results   Component Value Date     WBC 7.1 2025    RBC 4.01 2025    HGB 12.4 2025    HCT 38.3 2025    MCV 95.5 2025    MCH 30.9 2025    MCHC 32.4 2025    RDW 13.9 2025    .0 2025     Lab Results   Component Value Date    GLU 92 2025    BUN 30 (H) 2025    BUNCREA 28.6 (H) 2025    CREATSERUM 1.05 (H) 2025    ANIONGAP 8 2025    CA 9.8 2025    OSMOCALC 298 (H) 2025    ALKPHO 52 (L) 2025    AST 20 2025    ALT 8 (L) 2025    BILT 0.4 2025    TP 7.0 2025    ALB 4.6 2025    GLOBULIN 2.4 2025     2025    K 4.4 2025     2025    CO2 28.0 2025     Lab Results   Component Value Date    PTP 13.3 2023    INR 1.02 2023     Lab Results   Component Value Date    VITD 30.0 2024              [1]   Past Medical History:   Anxiety    Cancer (HCC)    only skin cancer    Depression    bupropion    Esophageal reflux    HIV (human immunodeficiency virus infection) (HCC)    Hyperlipidemia    take losartan    Hypothyroidism    levothyroxine    Osteopenia   [2]   Past Surgical History:  Procedure Laterality Date    Back surgery      32 years ago, and 5 years ago          Cataract      removed approx 6 years ago    Colonoscopy      no longer have exam due to age    Implant cochlear device      Skin surgery      ongoing for cancer    Tonsillectomy     [3]   Current Outpatient Medications   Medication Sig Dispense Refill    omeprazole 20 MG Oral Capsule Delayed Release Take 1 capsule (20 mg total) by mouth daily as needed. 90 capsule 3    bictegravir-emtricitabine-tenofovir alafenamide (BIKTARVY) -25 MG Oral Tab Take 1 tablet by mouth daily. 90 tablet 3    losartan 100 MG Oral Tab Take 1 tablet (100 mg total) by mouth daily. 90 tablet 3    pravastatin 20 MG Oral Tab Take 1 tablet (20 mg total) by mouth nightly. 90 tablet 3    levothyroxine 150 MCG Oral Tab Take 1 tablet  (150 mcg total) by mouth before breakfast. 90 tablet 3    alendronate 70 MG Oral Tab Take 1 tablet (70 mg total) by mouth once a week. 12 tablet 3    nitroglycerin 0.4 MG Sublingual SL Tab Place 1 tablet (0.4 mg total) under the tongue every 5 (five) minutes as needed for Chest pain.     [4]   Allergies  Allergen Reactions    Penicillins HIVES and RASH    Chloramphenicol RASH     Unknown cleanser that was used prior to a surgery caused severe skin reaction    Mirabegron NAUSEA ONLY   [5]   Family History  Problem Relation Age of Onset    Breast Cancer Mother 70    Heart Attack Father         sudden fatal attack   [6]   Social History  Socioeconomic History    Marital status: Single   Tobacco Use    Smoking status: Former     Current packs/day: 0.00     Average packs/day: 0.5 packs/day for 25.0 years (12.5 ttl pk-yrs)     Types: Cigarettes     Quit date:      Years since quittin.4    Smokeless tobacco: Never   Vaping Use    Vaping status: Never Used   Substance and Sexual Activity    Alcohol use: Yes     Alcohol/week: 2.0 standard drinks of alcohol     Types: 2 Glasses of wine per week     Comment: 1-2 drinks a month    Drug use: Not Currently     Types: Cannabis     Comment: very rarely and not for years     Social Drivers of Health     Food Insecurity: No Food Insecurity (2025)    Received from [facility]    Food Insecurity     Currently or in the past 3 months, have you worried your food would run out before you had money to buy more?: No     In the past 12 months, have you run out of food or been unable to get more?: No   Transportation Needs: No Transportation Needs (2025)    Received from [facility]    Transportation Needs     Currently or in the past 3 months, has lack of transportation kept you from medical appointments, getting food or medicine, or providing care to a family member?: No

## 2025-05-28 NOTE — PROGRESS NOTES
[facility]  OFFICE FOLLOW UP EVALUATION      HISTORY OF PRESENT ILLNESS:     Chief Complaint   Patient presents with    Follow - Up     LOV 6/5/24 pt is here for a follow up on back pain. No n/t. No pain meds or muscle relaxer's. No current physical therapy. Pain 0/10       Patient is following up for low back pain.  She states pain is improved greatly.  She has been doing home exercises.  She is feeling 90 to 100% better almost.  She has some mild soreness in the morning after her exercises.  She is not taking medication.  She denies any numbness tingling or weakness.  Overall she is very happy with her progress.    PHYSICAL EXAM:   Ht 65\"   Wt 142 lb (64.4 kg)   BMI 23.63 kg/m²     Gait Able to toe walk and heel walk without any difficulty     LUMBAR SPINE:  Inspection: no erythema, swelling, or obvious deformity.  Their iliac crest and shoulder heights are symmetrical.     Palpation: Non tender to palpation of the spinous process.   ROM: Treated with forward flexion and extension  Strength: 5/5 in bilateral lower extremities  Sensation: Intact to light touch in all dermatomes of the lower extremities  Reflexes: 1/4 at L4 and S1  Facet Loading: Positive bilateral lower lumbar facet joints  Straight leg raise: negative for radicular pain symptoms  Slump test: negative for pain symptoms for radicular pain symptoms    IMAGING:     CT Lumbar spine completed on 4/29/2024 was personally reviewed which is notable for varying degrees of spinal and foraminal narrowing throughout the lumbar spine. There is degenerative disease throughout the lumbar spine. There is sparing degree of bulging disks as well. There is a remote L1 vertebral body fracture with history of prior kyphoplasty.     All imaging results were reviewed and discussed with patient.      ASSESSMENT/PLAN:     No diagnosis found.      Kenya Malave is a 84 year old female following up for back pain.  She  has responded well to PT and home exercises.  Recommend she continue over-the-counter topical treatment and Tylenol as needed.  She will follow-up as needed in the future.      The patient verbalized understanding with the plan and was in agreement. All questions/concerns were addressed and there were no barriers to learning.  Please note Dragon dictation software was used to dictate this note and may result in inadvertent typos.    Arnaud Mcdermott DO, FAAPMR & CAQSM  Physical Medicine and Rehabilitation  Sports and Spine Medicine    PAST MEDICAL HISTORY:     Past Medical History:    Anxiety    Cancer (HCC)    only skin cancer    Depression    bupropion    Esophageal reflux    HIV (human immunodeficiency virus infection) (HCC)    Hyperlipidemia    take losartan    Hypothyroidism    levothyroxine    Osteopenia         PAST SURGICAL HISTORY:     Past Surgical History:   Procedure Laterality Date    Back surgery      32 years ago, and 5 years ago          Cataract      removed approx 6 years ago    Colonoscopy      no longer have exam due to age    Implant cochlear device      Skin surgery      ongoing for cancer    Tonsillectomy           CURRENT MEDICATIONS:     Current Outpatient Medications   Medication Sig Dispense Refill    omeprazole 20 MG Oral Capsule Delayed Release Take 1 capsule (20 mg total) by mouth daily as needed. 90 capsule 3    bictegravir-emtricitabine-tenofovir alafenamide (BIKTARVY) -25 MG Oral Tab Take 1 tablet by mouth daily. 90 tablet 3    losartan 100 MG Oral Tab Take 1 tablet (100 mg total) by mouth daily. 90 tablet 3    pravastatin 20 MG Oral Tab Take 1 tablet (20 mg total) by mouth nightly. 90 tablet 3    levothyroxine 150 MCG Oral Tab Take 1 tablet (150 mcg total) by mouth before breakfast. 90 tablet 3    alendronate 70 MG Oral Tab Take 1 tablet (70 mg total) by mouth once a week. 12 tablet 3    nitroglycerin 0.4 MG Sublingual SL Tab Place 1 tablet (0.4 mg total) under the tongue  every 5 (five) minutes as needed for Chest pain.           ALLERGIES:     Allergies   Allergen Reactions    Penicillins HIVES and RASH    Chloramphenicol RASH     Unknown cleanser that was used prior to a surgery caused severe skin reaction    Mirabegron NAUSEA ONLY         FAMILY HISTORY:     Family History   Problem Relation Age of Onset    Breast Cancer Mother 70    Heart Attack Father         sudden fatal attack          SOCIAL HISTORY:     Social History     Socioeconomic History    Marital status: Single   Tobacco Use    Smoking status: Former     Current packs/day: 0.00     Average packs/day: 0.5 packs/day for 25.0 years (12.5 ttl pk-yrs)     Types: Cigarettes     Quit date:      Years since quittin.4    Smokeless tobacco: Never   Vaping Use    Vaping status: Never Used   Substance and Sexual Activity    Alcohol use: Yes     Alcohol/week: 2.0 standard drinks of alcohol     Types: 2 Glasses of wine per week     Comment: 1-2 drinks a month    Drug use: Not Currently     Types: Cannabis     Comment: very rarely and not for years     Social Drivers of Health     Food Insecurity: No Food Insecurity (2025)    Received from [facility]    Food Insecurity     Currently or in the past 3 months, have you worried your food would run out before you had money to buy more?: No     In the past 12 months, have you run out of food or been unable to get more?: No   Transportation Needs: No Transportation Needs (2025)    Received from [facility]    Transportation Needs     Currently or in the past 3 months, has lack of transportation kept you from medical appointments, getting food or medicine, or providing care to a family member?: No          REVIEW OF SYSTEMS:   A comprehensive 10 point review of systems was completed.  Pertinent positives and negatives noted in the the HPI.      LABS:     Lab Results   Component Value Date     04/10/2024    A1C 5.9 (H)  04/10/2024     Lab Results   Component Value Date    WBC 7.1 05/08/2025    RBC 4.01 05/08/2025    HGB 12.4 05/08/2025    HCT 38.3 05/08/2025    MCV 95.5 05/08/2025    MCH 30.9 05/08/2025    MCHC 32.4 05/08/2025    RDW 13.9 05/08/2025    .0 05/08/2025     Lab Results   Component Value Date    GLU 92 04/23/2025    BUN 30 (H) 04/23/2025    BUNCREA 28.6 (H) 04/23/2025    CREATSERUM 1.05 (H) 04/23/2025    ANIONGAP 8 04/23/2025    CA 9.8 04/23/2025    OSMOCALC 298 (H) 04/23/2025    ALKPHO 52 (L) 04/23/2025    AST 20 04/23/2025    ALT 8 (L) 04/23/2025    BILT 0.4 04/23/2025    TP 7.0 04/23/2025    ALB 4.6 04/23/2025    GLOBULIN 2.4 04/23/2025     04/23/2025    K 4.4 04/23/2025     04/23/2025    CO2 28.0 04/23/2025     Lab Results   Component Value Date    PTP 13.3 06/01/2023    INR 1.02 06/01/2023     Lab Results   Component Value Date    VITD 30.0 01/23/2024

## 2025-06-03 ENCOUNTER — OFFICE VISIT (OUTPATIENT)
Facility: CLINIC | Age: 85
End: 2025-06-03
Payer: MEDICARE

## 2025-06-03 VITALS
OXYGEN SATURATION: 98 % | SYSTOLIC BLOOD PRESSURE: 110 MMHG | BODY MASS INDEX: 23.49 KG/M2 | HEART RATE: 88 BPM | HEIGHT: 65 IN | WEIGHT: 141 LBS | DIASTOLIC BLOOD PRESSURE: 60 MMHG

## 2025-06-03 DIAGNOSIS — R73.03 PREDIABETES: ICD-10-CM

## 2025-06-03 DIAGNOSIS — M48.062 SPINAL STENOSIS OF LUMBAR REGION WITH NEUROGENIC CLAUDICATION: ICD-10-CM

## 2025-06-03 DIAGNOSIS — H90.3 SENSORY HEARING LOSS, BILATERAL: ICD-10-CM

## 2025-06-03 DIAGNOSIS — F33.41 RECURRENT MAJOR DEPRESSIVE DISORDER, IN PARTIAL REMISSION: ICD-10-CM

## 2025-06-03 DIAGNOSIS — Z00.00 MEDICARE ANNUAL WELLNESS VISIT, SUBSEQUENT: Primary | ICD-10-CM

## 2025-06-03 DIAGNOSIS — N18.31 CKD STAGE 3A, GFR 45-59 ML/MIN (HCC): ICD-10-CM

## 2025-06-03 DIAGNOSIS — M81.0 AGE-RELATED OSTEOPOROSIS WITHOUT CURRENT PATHOLOGICAL FRACTURE: ICD-10-CM

## 2025-06-03 DIAGNOSIS — N95.8 GENITOURINARY SYNDROME OF MENOPAUSE: ICD-10-CM

## 2025-06-03 DIAGNOSIS — H81.13 BENIGN PAROXYSMAL POSITIONAL VERTIGO DUE TO BILATERAL VESTIBULAR DISORDER: ICD-10-CM

## 2025-06-03 DIAGNOSIS — G31.84 MILD COGNITIVE IMPAIRMENT: ICD-10-CM

## 2025-06-03 DIAGNOSIS — B20 HUMAN IMMUNODEFICIENCY VIRUS (HIV) DISEASE (HCC): ICD-10-CM

## 2025-06-03 DIAGNOSIS — G91.2 NPH (NORMAL PRESSURE HYDROCEPHALUS) (HCC): ICD-10-CM

## 2025-06-03 DIAGNOSIS — N39.3 SUI (STRESS URINARY INCONTINENCE, FEMALE): ICD-10-CM

## 2025-06-03 DIAGNOSIS — N18.30 BENIGN HYPERTENSION WITH CHRONIC KIDNEY DISEASE, STAGE III (HCC): ICD-10-CM

## 2025-06-03 DIAGNOSIS — K21.9 GASTROESOPHAGEAL REFLUX DISEASE WITHOUT ESOPHAGITIS: ICD-10-CM

## 2025-06-03 DIAGNOSIS — I45.10 COMPLETE RIGHT BUNDLE BRANCH BLOCK: ICD-10-CM

## 2025-06-03 DIAGNOSIS — E03.9 HYPOTHYROIDISM, UNSPECIFIED TYPE: ICD-10-CM

## 2025-06-03 DIAGNOSIS — F41.9 ANXIETY: ICD-10-CM

## 2025-06-03 DIAGNOSIS — I12.9 BENIGN HYPERTENSION WITH CHRONIC KIDNEY DISEASE, STAGE III (HCC): ICD-10-CM

## 2025-06-03 DIAGNOSIS — E78.00 HYPERCHOLESTEROLEMIA: ICD-10-CM

## 2025-06-03 DIAGNOSIS — R41.3 MEMORY CHANGE: ICD-10-CM

## 2025-06-03 DIAGNOSIS — S32.010D COMPRESSION FRACTURE OF L1 VERTEBRA WITH ROUTINE HEALING: ICD-10-CM

## 2025-06-03 PROBLEM — R07.2 PRECORDIAL PAIN: Status: RESOLVED | Noted: 2019-01-07 | Resolved: 2025-06-03

## 2025-06-03 PROCEDURE — 99499 UNLISTED E&M SERVICE: CPT | Performed by: FAMILY MEDICINE

## 2025-06-03 PROCEDURE — 99214 OFFICE O/P EST MOD 30 MIN: CPT | Performed by: FAMILY MEDICINE

## 2025-06-03 PROCEDURE — G0439 PPPS, SUBSEQ VISIT: HCPCS | Performed by: FAMILY MEDICINE

## 2025-06-03 RX ORDER — LOSARTAN POTASSIUM 50 MG/1
50 TABLET ORAL DAILY
Qty: 90 TABLET | Refills: 3 | Status: SHIPPED | OUTPATIENT
Start: 2025-06-03

## 2025-06-03 NOTE — PROGRESS NOTES
Subjective:   Kenya Malave is a 84 year old female who presents for a Subsequent Annual Wellness visit (Pt already had Initial Annual Wellness) and scheduled follow up of multiple significant but stable problems.     Low back pain with history of compression fracture and lumbar spinal stenosis  Following with physiatry. Patient able to manage with stretching. Holding off on injections for now     Falls  Patient has completed PT for this. Does have upcoming appointment with neurology to address brain fog and NPH.     Memory  Continues to endorse brain fog. Labs done last year for this normal. B12 normal. TSH normal.     Stress incontinence  Well controlled with botox injections. Recently had surgical clearance with cardiology given change from incomplete RBBB to complete RBBB on EKG. Cardiology States no further work up needed and clear for injections.      Prediabetes  Patient follows vegan diet. Sometimes vegetarian. Patient eats eggs.      Hypothyroidism  Doing well on levothyroxine. Recent TSH this year. Normal.      HTN with stage 3 CKD  Patient has noted some intermittent dizziness. Is wondering if losartan dose needs to be adjusted.      HIV  Doing well on Biktarvy. Due for CD4 count and HIV quant level. States intermittently misses biktarvy doses     HLD  On pravastatin. Doing well. Due for repeat LDL     Osteoporosis  Doing well on alendronate     Anxiety/depression  Patient follows with therapist monthly. Stopped taking wellbutrin as did not find this helpful.     GERD  Doing well on omeprazole     Sensory hearing loss  Patient wears hearing aids bilaterally. Functioning well. Is working on following back up with [facility]    History/Other:   Fall Risk Assessment:   She has been screened for Falls and is High Risk. Fall Prevention information provided to patient in After Visit Summary.    Do you feel unsteady when standing or walking?: No  Do you worry about falling?: No  Have you fallen  in the past year?: Yes  How many times have you fallen?: 1  Were you injured?: No     Cognitive Assessment:   She had a completely normal cognitive assessment - see flowsheet entries     Functional Ability/Status:   Kenya Malave has some abnormal functions as listed below:  She has Hearing problems based on screening of functional status.She has Vision problems based on screening of functional status. She has problems with Memory based on screening of functional status.       Depression Screening (PHQ):  PHQ-9 TOTAL SCORE: 5  , done 6/3/2025   Little interest or pleasure in doing things: 1    Feeling down, depressed, or hopeless: 1    Trouble falling or staying asleep, or sleeping too much: 1     Feeling tired or having little energy: 1    Feeling bad about yourself - or that you are a failure or have let yourself or your family down: 1    If you checked off any problems, how difficult have these problems made it for you to do your work, take care of things at home, or get along with other people?: Not difficult at all      Advanced Directives:   She does have a Living Will but we do NOT have it on file in Epic.    She does have a POA but we do NOT have it on file in [employer].    Patient has Advance Care Planning documents but we do not have a copy in EMR. Discussed Advanced Care Planning with patient and instructed patient to get our office a copy to be scanned into EMR.      Problem List[1]  Allergies:  She is allergic to penicillins, chloramphenicol, and mirabegron.    Current Medications:  Active Meds, Sig Only[2]    Medical History:  She  has a past medical history of Anxiety (about  3 years ago), Cancer ([facility]), Depression (mild), Esophageal reflux (mild,  fixed with omreprazole), HIV (human immunodeficiency virus infection) ([facility]) (), Hyperlipidemia, Hypothyroidism, and Osteopenia.  Surgical History:  She  has a past surgical history that includes back surgery; ; implant cochlear device;  cataract; colonoscopy; skin surgery; and tonsillectomy.   Family History:  Her family history includes Breast Cancer (age of onset: 70) in her mother; Heart Attack in her father.  Social History:  She  reports that she quit smoking about 32 years ago. Her smoking use included cigarettes. She has a 12.5 pack-year smoking history. She has never used smokeless tobacco. She reports current alcohol use of about 2.0 standard drinks of alcohol per week. She reports that she does not currently use drugs after having used the following drugs: Cannabis.    Tobacco:  She smoked tobacco in the past but quit greater than 12 months ago.  Tobacco Use[3]     CAGE Alcohol Screen:   CAGE screening score of 0 on 6/3/2025, showing low risk of alcohol abuse.      Patient Care Team:  Rayna Coates MD as PCP - General (Family Medicine)    Review of Systems   Constitutional: Negative.    HENT: Negative.     Eyes: Negative.    Respiratory: Negative.     Cardiovascular: Negative.    Gastrointestinal: Negative.    Genitourinary: Negative.    Musculoskeletal: Negative.    Skin: Negative.    Neurological: Negative.    Psychiatric/Behavioral: Negative.           Objective:   Physical Exam  Vitals and nursing note reviewed.   Constitutional:       General: She is not in acute distress.     Appearance: Normal appearance. She is not ill-appearing.   HENT:      Head: Normocephalic and atraumatic.      Right Ear: Tympanic membrane normal. There is no impacted cerumen.      Left Ear: Tympanic membrane normal. There is no impacted cerumen.      Mouth/Throat:      Mouth: Mucous membranes are moist.      Pharynx: Oropharynx is clear. No oropharyngeal exudate or posterior oropharyngeal erythema.   Eyes:      General:         Right eye: No discharge.         Left eye: No discharge.      Extraocular Movements: Extraocular movements intact.      Pupils: Pupils are equal, round, and reactive to light.   Cardiovascular:      Rate and Rhythm: Normal rate and  regular rhythm.      Heart sounds: Normal heart sounds. No murmur heard.     No friction rub. No gallop.   Pulmonary:      Effort: Pulmonary effort is normal.      Breath sounds: Normal breath sounds. No wheezing, rhonchi or rales.   Abdominal:      General: Abdomen is flat. Bowel sounds are normal. There is no distension.      Palpations: Abdomen is soft. There is no mass.      Tenderness: There is no abdominal tenderness. There is no guarding or rebound.   Musculoskeletal:         General: Normal range of motion.      Cervical back: Normal range of motion.      Right lower leg: No edema.      Left lower leg: No edema.   Skin:     General: Skin is warm and dry.      Findings: No rash.   Neurological:      General: No focal deficit present.      Mental Status: She is alert. Mental status is at baseline.   Psychiatric:         Mood and Affect: Mood normal.         Behavior: Behavior normal.           /60   Pulse 88   Ht 5' 5\" (1.651 m)   Wt 141 lb (64 kg)   SpO2 98%   BMI 23.46 kg/m²  Estimated body mass index is 23.46 kg/m² as calculated from the following:    Height as of this encounter: 5' 5\" (1.651 m).    Weight as of this encounter: 141 lb (64 kg).    BP Readings from Last 10 Encounters:   06/03/25 110/60   05/08/25 124/68   11/25/24 110/66   10/17/24 120/67   07/06/24 98/79   05/10/24 130/66   04/29/24 146/82   02/01/24 119/69   01/23/24 104/56   06/09/23 126/73        Medicare Hearing Assessment:   Hearing Screening    Screening Method: Finger Rub  Finger Rub Result: Pass         Visual Acuity:   Right Eye Visual Acuity: Uncorrected Right Eye Chart Acuity: 20/30   Left Eye Visual Acuity: Uncorrected Left Eye Chart Acuity: 20/30   Both Eyes Visual Acuity: Uncorrected Both Eyes Chart Acuity: 20/30   Able To Tolerate Visual Acuity: Yes        Assessment & Plan:   Kenya Malave is a 84 year old female who presents for a Medicare Assessment.     1. Medicare annual wellness visit, subsequent  (Primary)  -     Lipid Panel; Future; Expected date: 06/03/2025    -ordered due annual labs. Reviewed previously obtained labd done 4/23/25 and 5/8/25 with patient    2. Benign hypertension with chronic kidney disease, stage III ([facility]).    Orders:  -     Losartan Potassium; Take 1 tablet (50 mg total) by mouth daily.  Dispense: 90 tablet; Refill: 3  -     Detailed, Mod Complex (91458)    -well controlled. Patient may be experiencing dizziness from low normal blood pressures. Will decrease dose from 100 mg to 50 mg. Patient to monitor Bps and to inform provider if notes sustained values >140/90    3. CKD stage 3a, GFR 45-59 ml/min ([facility])  -     Comp Metabolic Panel (14); Future; Expected date: 06/03/2025  -     Detailed, Mod Complex (25646)    -stable. On losartan as above. Ordered updated CMP    4. Human immunodeficiency virus (HIV) disease ([facility])  Overview:  Formatting of this note might be different from the original.   Originally dx'd 1/1996. Lowest cd4 was around 190. No hx of oi's. Hep a/b immune. Last pneumovax 2019  prevnar given 2015. Initially was on norvir monotherapy  switched to boosted prezista/truvada for years.  Switched off tfv to isentress plus boosted    prezista plus emtriva.       Switched to biktarvy for reduction in cardiac risk (associated with abacavir). Neg quantiferon 1/2019.    Orders:  -     Cd4/Cd8 Ratio Profile; Future; Expected date: 06/03/2025  -     HIV-1 RNA PCR, Quantitative (HIV Viral Load); Future; Expected date: 06/03/2025  -     Detailed, Mod Complex (53142)    -well controlled. Intermittently misses doses. Ordered updated monitoring labs    5. NPH (normal pressure hydrocephalus) ([facility])  Overview:  Has had neuropsych evaluation. See care everywhere 7/26/2021 admission. ~sp  Orders:  -     Detailed, Mod Complex (86519)    -stable. Has upcoming neurology appointment     6. Hypercholesterolemia    Orders:  -     Detailed, Mod Complex (56125)    -well controlled on statin. Ordered  updated lipid panel as above    7. Age-related osteoporosis without current pathological fracture  Overview:  Started alendronate due to high fracture risk. 1/23/2024. Will consider therapy for 5-10 years SP  Orders:  -     Detailed, Mod Complex (30006)    -stable doing well on alendronate. Due for repeat dexa in 2026    8. Hypothyroidism, unspecified type  Orders:    -     Detailed, Mod Complex (27371)    -TSH normal this year. Well controlled. No dose changes to levothyroxine     9. Prediabetes  -     Hemoglobin A1C; Future; Expected date: 06/03/2025  -     Lipid Panel; Future; Expected date: 06/03/2025  -     Detailed, Mod Complex (48428)    -stable well controlled. Ordered updated A1c    10. Compression fracture of L1 vertebra with routine healing  -     Detailed, Mod Complex (21494)    -stable. Pain fairly well controlled. To continue stretching and follow up with physiatry    11. Complete right bundle branch block  -     Detailed, Mod Complex (18707)    -stable. Has seen cardiology who does not think any further work up needs to be done    12. Sensory hearing loss, bilateral  Overview:  Formatting of this note might be different from the original.   S/p cochlear implant 12/2012.       Orders:  -     Detailed, Mod Complex (42872)    -stable well controlled. Advised to continue working on following back up with [facility]    13. Mild cognitive impairment  Overview:  Formatting of this note might be different from the original.   occ difficulty remembering numbers.            Last Assessment & Plan:    Formatting of this note might be different from the original.   2/3/2022 - thought to have NPH by neurosurgery, s/p therapeutic LP. She felt there was some improvement in memory, though she still had impairment re short-term memory. As per neurosurgery.    4/6/2023 - no sig of dementia per neurology eval earlier this year. They felt her symptoms are largely related to anxiety/depression. She feels she  has had a very slow decline in memory. For example, she has had more diff remembering peoples' names, occ forgetting names of objects (which was not occurring last year).    Orders:  -     T Pallidum Screening Cascade; Future; Expected date: 06/03/2025  -     Detailed, Mod Complex (23481)    -reviewed previously normal labs done for work up in the past with patient. Very low suspicion for RPR contributing but never checked so ordered. Also to keep scheduled appointment with neurolog    -continues to endorse worsening memory     14. Spinal stenosis of lumbar region with neurogenic claudication  Overview:  Formatting of this note might be different from the original.   S/p laminectomy 2018.       Last Assessment & Plan:    Formatting of this note might be different from the original.   7/2018 - will be undergoing laminectomy on 7/26/2018 under twilight anesthesia with Dr Rosoce hope.    1/2019 - s/p surgery. Dramatic improvement in pain after surgery. Takes ibuprofen or tylenol prn.    Orders:  -     Detailed, Mod Complex (20396)    -pain well controlled. Advised continued follow up with physiatry    16. Anxiety  -     Detailed, Mod Complex (11138)    -stable well controlled. Advised to continue with therapy    17. Recurrent major depressive disorder, in partial remission    Orders:  -     Detailed, Mod Complex (88428)    -stable well controlled. Advised to continue with therapy. No longer on wellbutrin    18. Gastroesophageal reflux disease without esophagitis  -     Detailed, Mod Complex (78230)    -stable. Well controlled on PPI    19. Genitourinary syndrome of menopause  -     Detailed, Mod Complex (80033)    -stable. Well controlled. Advised continued follow up with urology    20. KATHI (stress urinary incontinence, female)  Overview:  Follows with [facility]. Gets botox injections under sedation SP  Orders:  -     Detailed, Mod Complex (43664)    -stable. Well controlled. Advised continued follow up with  urology.    21. Benign paroxysmal positional vertigo due to bilateral vestibular disorder  Orders:  -     Detailed, Mod Complex (04562)    -stable well controlled. No further signs of disease reoccurrence. Will continue to monitor    The patient indicates understanding of these issues and agrees to the plan.  Reinforced healthy diet, lifestyle, and exercise.      Return in 1 year (on 6/3/2026).     Rayna Coates MD, 6/3/2025     Supplementary Documentation:   General Health:  In the past six months, have you lost more than 10 pounds without trying?: 2 - No  Has your appetite been poor?: No  Type of Diet: Vegetarian  How does the patient maintain a good energy level?: Daily Walks  How would you describe your daily physical activity?: Moderate  How would you describe your current health state?: Good  How do you maintain positive mental well-being?: Visiting Family  On a scale of 0 to 10, with 0 being no pain and 10 being severe pain, what is your pain level?: 0 - (None)  In the past six months, have you experienced urine leakage?: 1-Yes  At any time do you feel concerned for the safety/well-being of yourself and/or your children, in your home or elsewhere?: Yes  Have you had any immunizations at another office such as Influenza, Hepatitis B, Tetanus, or Pneumococcal?: Yes    Health Maintenance   Topic Date Due    COVID-19 Vaccine (8 - 2024-25 season) 03/06/2025    Annual Physical  05/10/2025    Influenza Vaccine  Completed    DEXA Scan  Completed    Annual Depression Screening  Completed    Fall Risk Screening (Annual)  Completed    Pneumococcal Vaccine: 50+ Years  Completed    Zoster Vaccines  Completed    Meningococcal B Vaccine  Aged Out            [1]   Patient Active Problem List  Diagnosis    Compression fracture of L1 vertebra with routine healing    Anxiety    BPPV (benign paroxysmal positional vertigo)    Depression    Benign hypertension with chronic kidney disease, stage III ([facility])    Genitourinary syndrome  of menopause    Human immunodeficiency virus (HIV) disease ([facility])    Hypercholesterolemia    Hypothyroid    Spinal stenosis of lumbar region with neurogenic claudication    Mild cognitive impairment    NPH (normal pressure hydrocephalus) ([facility])    Age-related osteoporosis without current pathological fracture    Sensory hearing loss, bilateral    Gastroesophageal reflux disease without esophagitis    KATHI (stress urinary incontinence, female)    Prediabetes    CKD stage 3a, GFR 45-59 ml/min ([facility])    Complete right bundle branch block   [2]   Outpatient Medications Marked as Taking for the 6/3/25 encounter (Office Visit) with Rayna Coates MD   Medication Sig    losartan 50 MG Oral Tab Take 1 tablet (50 mg total) by mouth daily.    omeprazole 20 MG Oral Capsule Delayed Release Take 1 capsule (20 mg total) by mouth daily as needed.    bictegravir-emtricitabine-tenofovir alafenamide (BIKTARVY) -25 MG Oral Tab Take 1 tablet by mouth daily.    pravastatin 20 MG Oral Tab Take 1 tablet (20 mg total) by mouth nightly.    levothyroxine 150 MCG Oral Tab Take 1 tablet (150 mcg total) by mouth before breakfast.    alendronate 70 MG Oral Tab Take 1 tablet (70 mg total) by mouth once a week.    nitroglycerin 0.4 MG Sublingual SL Tab Place 1 tablet (0.4 mg total) under the tongue every 5 (five) minutes as needed for Chest pain.   [3]   Social History  Tobacco Use   Smoking Status Former    Current packs/day: 0.00    Average packs/day: 0.5 packs/day for 25.0 years (12.5 ttl pk-yrs)    Types: Cigarettes    Quit date:     Years since quittin.4   Smokeless Tobacco Never

## 2025-06-09 DIAGNOSIS — M81.0 AGE-RELATED OSTEOPOROSIS WITHOUT CURRENT PATHOLOGICAL FRACTURE: ICD-10-CM

## 2025-06-11 RX ORDER — ALENDRONATE SODIUM 70 MG/1
70 TABLET ORAL WEEKLY
Qty: 12 TABLET | Refills: 3 | Status: SHIPPED | OUTPATIENT
Start: 2025-06-11

## 2025-06-18 ENCOUNTER — TELEPHONE (OUTPATIENT)
Dept: PHYSICAL MEDICINE AND REHAB | Facility: CLINIC | Age: 85
End: 2025-06-18

## 2025-06-18 DIAGNOSIS — M47.816 LUMBAR FACET ARTHROPATHY: Primary | ICD-10-CM

## 2025-06-18 NOTE — TELEPHONE ENCOUNTER
Incoming call from patient who states that she needs the injection that Dr. Mcdermott spoke about during LOV.       Plan per Dr. Mcdermott's LOV (05/28/2025) note: \"Lumbar facet arthropathy  Chronic lumbar facet arthropathy causing lower back pain, improved with movement. Previous physical therapy was ineffective. Discussed NSAID and acetaminophen use, and facet joint injections as treatment options. Emphasized exercise importance.  - Encourage regular walking and movement.  - Consider acetaminophen daily, two tablets morning and afternoon as needed.  - Discuss facet joint injections if pain persists.  - Order physical therapy referral with option for different provider.  - Provide names of local physical therapy providers.\"         No specific levels mentioned, unable to place orders per protocol. RN will speak with   Dr. Mcdermott regarding injection order clarification.     Once orders have been received, signed and authorized. Patient requesting a message with instructions to call to schedule injection. Pt is [facility] and uses a translation/[id number] henry on her phone which does not work if she receives a call, only if she places the call.

## 2025-06-18 NOTE — TELEPHONE ENCOUNTER
Per KIKO Amaya to order B/L L3-4, L4-5, L5-S1 Facet Injections. If only 2 are approved, then schedule the bottom 2.     Order placed per protocol.     Note placed in pt's chart for PMR  to message pt via [facility] once approved with instructions for pt to call when ready to schedule.

## 2025-06-18 NOTE — TELEPHONE ENCOUNTER
Provider's Protocol: Per Spinal Intervention Reference \"patient does not have to hold ASA or blood thinners when performing lumbar facet or medial branch blocks.\"   Patient has been scheduled for Bilateral L4-5 and L5-S1 Facet Injections on 6/23/2025 at the [facility] with Dr. Mcdermott.   Anesthesia type:  Local  Please note: The [facility] will call the business day prior to discuss the exact time/arrival and additional instructions for your appointment.  Patient was advised that if he/she does receive the covid vaccine it needs to be at least 2 weeks before or after the injection.  Medications and allergies reviewed.  Patient informed of [facility]'s  policy:  The patient will require transportation arrangements to and from the procedure, with the  present on site for the entire visit.  Without a , the appointment is subject to cancellation.    [facility] is located in the [facility] [address] [address].   may park in the yellow/purple parking lot.  Patient verbalized understanding and agrees with plan.  Scheduled in Epic: Yes  Scheduled in Surgical Case: Yes  Follow up appointment made: NOV: Visit date not found  Authorization date valid until N/A at [facility] Only.

## 2025-06-18 NOTE — TELEPHONE ENCOUNTER
Original order: Bilateral L3-4, L4-5, L5-S1 Facet Injections under fluoroscopic guidance.    Per CMS Guidelines-One to two levels, either unilateral or bilateral, are allowed per session per spine region. The need for a three or four-level procedure bilaterally may be considered under unique circumstances and with sufficient documentation of medical necessity on appeal. A session is a time period, which includes all procedures (i.e., medial branch block (MBB), intraarticular injections (IA), facet cyst ruptures, and RFA ablations that are performed during the same day. Frequency Limitation: For each covered spinal region no more than two (2) radiofrequency sessions will be reimbursed per rolling 12 months.    Per Dr. Mcdermott, always bottom 2 levels unless specified otherwise in the order    Per CMS Guidelines -no authorization is required for Bilateral L4-5, L5-S1 Facet Injections under fluoroscopic guidance.  CPT code(s): 99245-49, 99446 x's 2     Status: Authorization is not required based on medical necessity however is not a guarantee of payment and may be subject to review once claim is submitted.  If this procedure is being performed at Outpatient Hospital/[facility]- authorization is required, please notify this writer.

## 2025-06-19 PROBLEM — I10 ESSENTIAL HYPERTENSION: Status: ACTIVE | Noted: 2019-02-28

## 2025-06-23 ENCOUNTER — APPOINTMENT (OUTPATIENT)
Dept: SURGERY | Facility: CLINIC | Age: 85
End: 2025-06-23
Payer: MEDICARE

## 2025-06-23 PROBLEM — M47.816 LUMBAR FACET ARTHROPATHY: Status: ACTIVE | Noted: 2025-06-23

## 2025-06-26 DIAGNOSIS — M81.0 AGE-RELATED OSTEOPOROSIS WITHOUT CURRENT PATHOLOGICAL FRACTURE: ICD-10-CM

## 2025-06-26 RX ORDER — ALENDRONATE SODIUM 70 MG/1
70 TABLET ORAL WEEKLY
Qty: 12 TABLET | Refills: 3 | Status: CANCELLED | OUTPATIENT
Start: 2025-06-26

## 2025-06-30 NOTE — TELEPHONE ENCOUNTER
Prescription was sent to [facility] for year supply 6/11/25  Outpatient Medication Detail   Disp Refills Start End    alendronate 70 MG Oral Tab 12 tablet 3 6/11/2025 --    Sig - Route: Take 1 tablet (70 mg total) by mouth once a week. - Oral    Sent to pharmacy as: Alendronate Sodium 70 MG Oral Tablet (Fosamax)    Notes to Pharmacy: Requesting 1 year supply    E-Prescribing Status: Receipt confirmed by pharmacy (6/11/2025 11:54 PM CDT)    Associated Diagnoses  Age-related osteoporosis without current pathological fracture      Pharmacy  [facility] - [address] [phone], [phone]

## 2025-08-05 ENCOUNTER — TELEPHONE (OUTPATIENT)
Facility: CLINIC | Age: 85
End: 2025-08-05

## 2025-08-05 ENCOUNTER — NURSE TRIAGE (OUTPATIENT)
Facility: CLINIC | Age: 85
End: 2025-08-05

## 2025-08-05 ENCOUNTER — HOSPITAL ENCOUNTER (OUTPATIENT)
Age: 85
Discharge: HOME | End: 2025-08-05

## 2025-08-05 VITALS
DIASTOLIC BLOOD PRESSURE: 59 MMHG | SYSTOLIC BLOOD PRESSURE: 121 MMHG | HEART RATE: 86 BPM | TEMPERATURE: 98 F | RESPIRATION RATE: 16 BRPM | OXYGEN SATURATION: 98 %

## 2025-08-05 DIAGNOSIS — K59.00 CONSTIPATION, UNSPECIFIED CONSTIPATION TYPE: Primary | ICD-10-CM

## 2025-08-05 PROCEDURE — 99213 OFFICE O/P EST LOW 20 MIN: CPT | Performed by: NURSE PRACTITIONER

## 2025-08-05 RX ORDER — SODIUM PHOSPHATE, DIBASIC AND SODIUM PHOSPHATE, MONOBASIC 7; 19 G/230ML; G/230ML
1 ENEMA RECTAL ONCE AS NEEDED
Qty: 133 ML | Refills: 0 | Status: SHIPPED | OUTPATIENT
Start: 2025-08-05 | End: 2025-08-05

## 2025-08-06 ENCOUNTER — PATIENT MESSAGE (OUTPATIENT)
Facility: CLINIC | Age: 85
End: 2025-08-06

## 2025-08-15 ENCOUNTER — LAB ENCOUNTER (OUTPATIENT)
Dept: LAB | Age: 85
End: 2025-08-15
Attending: FAMILY MEDICINE

## 2025-08-15 DIAGNOSIS — E78.00 HYPERCHOLESTEROLEMIA: ICD-10-CM

## 2025-08-15 DIAGNOSIS — R73.03 PREDIABETES: ICD-10-CM

## 2025-08-15 DIAGNOSIS — N18.31 CKD STAGE 3A, GFR 45-59 ML/MIN (HCC): ICD-10-CM

## 2025-08-15 DIAGNOSIS — G31.84 MILD COGNITIVE IMPAIRMENT: ICD-10-CM

## 2025-08-15 DIAGNOSIS — B20 HUMAN IMMUNODEFICIENCY VIRUS (HIV) DISEASE (HCC): ICD-10-CM

## 2025-08-15 LAB
ALBUMIN SERPL-MCNC: 4.2 G/DL (ref 3.2–4.8)
ALBUMIN/GLOB SERPL: 1.8 (ref 1–2)
ALP LIVER SERPL-CCNC: 47 U/L (ref 55–142)
ALT SERPL-CCNC: 8 U/L (ref 10–49)
ANION GAP SERPL CALC-SCNC: 7 MMOL/L (ref 0–18)
AST SERPL-CCNC: 17 U/L (ref ?–34)
BILIRUB SERPL-MCNC: 0.6 MG/DL (ref 0.2–1.1)
BUN BLD-MCNC: 19 MG/DL (ref 9–23)
BUN/CREAT SERPL: 18.6 (ref 10–20)
CALCIUM BLD-MCNC: 9.3 MG/DL (ref 8.7–10.4)
CD3+CD4+ CELLS # BLD: 1166 /MM3 (ref 519–1445)
CD3+CD4+ CELLS NFR BLD: 47 % (ref 34–59)
CD3+CD4+ CELLS/CD3+CD8+ CLL SPEC: 0.9 (ref 1.1–3.3)
CD3+CD8+ CELLS NFR SPEC: 52 % (ref 13–39)
CHLORIDE SERPL-SCNC: 101 MMOL/L (ref 98–112)
CHOLEST SERPL-MCNC: 165 MG/DL (ref ?–200)
CO2 SERPL-SCNC: 29 MMOL/L (ref 21–32)
CREAT BLD-MCNC: 1.02 MG/DL (ref 0.55–1.02)
EGFRCR SERPLBLD CKD-EPI 2021: 54 ML/MIN/1.73M2 (ref 60–?)
EST. AVERAGE GLUCOSE BLD GHB EST-MCNC: 128 MG/DL (ref 68–126)
FASTING PATIENT LIPID ANSWER: YES
FASTING STATUS PATIENT QL REPORTED: YES
GLOBULIN PLAS-MCNC: 2.4 G/DL (ref 2–3.5)
GLUCOSE BLD-MCNC: 97 MG/DL (ref 70–99)
HBA1C MFR BLD: 6.1 % (ref ?–5.7)
HDLC SERPL-MCNC: 59 MG/DL (ref 40–59)
LDLC SERPL CALC-MCNC: 91 MG/DL (ref ?–100)
NONHDLC SERPL-MCNC: 106 MG/DL (ref ?–130)
OSMOLALITY SERPL CALC.SUM OF ELEC: 286 MOSM/KG (ref 275–295)
POTASSIUM SERPL-SCNC: 4.5 MMOL/L (ref 3.5–5.1)
PROT SERPL-MCNC: 6.6 G/DL (ref 5.7–8.2)
SODIUM SERPL-SCNC: 137 MMOL/L (ref 136–145)
T PALLIDUM AB SER QL IA: NONREACTIVE
TRIGL SERPL-MCNC: 79 MG/DL (ref 30–149)
VLDLC SERPL CALC-MCNC: 13 MG/DL (ref 0–30)

## 2025-08-15 PROCEDURE — 86360 T CELL ABSOLUTE COUNT/RATIO: CPT

## 2025-08-15 PROCEDURE — 80061 LIPID PANEL: CPT

## 2025-08-15 PROCEDURE — 80053 COMPREHEN METABOLIC PANEL: CPT

## 2025-08-15 PROCEDURE — 86780 TREPONEMA PALLIDUM: CPT

## 2025-08-15 PROCEDURE — 87536 HIV-1 QUANT&REVRSE TRNSCRPJ: CPT

## 2025-08-15 PROCEDURE — 83036 HEMOGLOBIN GLYCOSYLATED A1C: CPT

## 2025-08-15 PROCEDURE — 36415 COLL VENOUS BLD VENIPUNCTURE: CPT

## 2025-08-16 ENCOUNTER — RESULTS FOLLOW-UP (OUTPATIENT)
Facility: CLINIC | Age: 85
End: 2025-08-16

## 2025-08-16 DIAGNOSIS — E03.9 HYPOTHYROIDISM, UNSPECIFIED TYPE: ICD-10-CM

## 2025-08-16 LAB — HIV 1 RNA PCR: <20 COPIES/ML

## 2025-08-19 RX ORDER — LEVOTHYROXINE SODIUM 150 UG/1
150 TABLET ORAL
Qty: 90 TABLET | Refills: 3 | Status: SHIPPED | OUTPATIENT
Start: 2025-08-19

## 2025-08-19 RX ORDER — PRAVASTATIN SODIUM 20 MG
20 TABLET ORAL NIGHTLY
Qty: 90 TABLET | Refills: 3 | Status: SHIPPED | OUTPATIENT
Start: 2025-08-19

## 2025-08-26 ENCOUNTER — LAB ENCOUNTER (OUTPATIENT)
Dept: LAB | Facility: HOSPITAL | Age: 85
End: 2025-08-26
Attending: Other

## 2025-08-26 ENCOUNTER — MED REC SCAN ONLY (OUTPATIENT)
Dept: NEUROLOGY | Facility: CLINIC | Age: 85
End: 2025-08-26

## 2025-08-26 DIAGNOSIS — G31.84 MCI (MILD COGNITIVE IMPAIRMENT): ICD-10-CM

## 2025-08-26 LAB
FOLATE SERPL-MCNC: 13.7 NG/ML (ref 5.4–?)
T PALLIDUM AB SER QL IA: NONREACTIVE
TSI SER-ACNC: 2.06 UIU/ML (ref 0.55–4.78)
VIT B12 SERPL-MCNC: >2000 PG/ML (ref 211–911)

## 2025-08-26 PROCEDURE — 36415 COLL VENOUS BLD VENIPUNCTURE: CPT

## 2025-08-26 PROCEDURE — 83520 IMMUNOASSAY QUANT NOS NONAB: CPT

## 2025-08-26 PROCEDURE — 84443 ASSAY THYROID STIM HORMONE: CPT

## 2025-08-26 PROCEDURE — 82607 VITAMIN B-12: CPT

## 2025-08-26 PROCEDURE — 86780 TREPONEMA PALLIDUM: CPT

## 2025-08-26 PROCEDURE — 82746 ASSAY OF FOLIC ACID SERUM: CPT

## 2025-08-28 LAB
BETA-AMYLOID 40: 235.05 PG/ML
BETA-AMYLOID 40: 235.05 PG/ML
BETA-AMYLOID 42/40 RATIO: 0.11
BETA-AMYLOID 42/40 RATIO: 0.11
BETA-AMYLOID 42: 26.3 PG/ML
BETA-AMYLOID 42: 26.3 PG/ML

## 2025-08-29 ENCOUNTER — TELEPHONE (OUTPATIENT)
Age: 85
End: 2025-08-29

## 2025-08-30 LAB
PHOSPHORYLATED TAU 217 (P-TAU217), PLASMA: 0.26 PG/ML
PHOSPHORYLATED TAU 217 (P-TAU217), PLASMA: 0.26 PG/ML

## (undated) NOTE — LETTER
1/24/2025    Kenya Malave  [address]  [place] [address]         To Whom It May Concern at [facility],    Kenya Malave is under my medical care.  Please excuse patient and refund fitness center membership dues from November 2024 through January 2025.      Sincerely,    Rayna Coates MD  [address] [place] [address] [address]  [place] [address]  Ph: [phone]  Fax: [phone]